# Patient Record
Sex: FEMALE | Race: WHITE | Employment: FULL TIME | URBAN - METROPOLITAN AREA
[De-identification: names, ages, dates, MRNs, and addresses within clinical notes are randomized per-mention and may not be internally consistent; named-entity substitution may affect disease eponyms.]

---

## 2017-01-10 NOTE — TELEPHONE ENCOUNTER
Patient last AE on 09/08/16. Did you want to refill spironolactone 100mg BID for PCOS for her? Please advise.

## 2017-01-12 RX ORDER — SPIRONOLACTONE 100 MG/1
TABLET, FILM COATED ORAL
Qty: 180 TAB | Refills: 2 | Status: SHIPPED | OUTPATIENT
Start: 2017-01-12 | End: 2017-10-26 | Stop reason: SDUPTHER

## 2017-09-18 RX ORDER — METFORMIN HYDROCHLORIDE 500 MG/1
TABLET, EXTENDED RELEASE ORAL
Qty: 360 TAB | Refills: 3 | OUTPATIENT
Start: 2017-09-18

## 2017-09-19 ENCOUNTER — OFFICE VISIT (OUTPATIENT)
Dept: INTERNAL MEDICINE CLINIC | Age: 42
End: 2017-09-19

## 2017-09-19 VITALS
HEART RATE: 82 BPM | BODY MASS INDEX: 26.36 KG/M2 | HEIGHT: 64 IN | WEIGHT: 154.4 LBS | OXYGEN SATURATION: 99 % | DIASTOLIC BLOOD PRESSURE: 56 MMHG | SYSTOLIC BLOOD PRESSURE: 105 MMHG | TEMPERATURE: 98.6 F | RESPIRATION RATE: 14 BRPM

## 2017-09-19 DIAGNOSIS — E28.2 PCOS (POLYCYSTIC OVARIAN SYNDROME): ICD-10-CM

## 2017-09-19 DIAGNOSIS — G44.59 OTHER COMPLICATED HEADACHE SYNDROME: Primary | ICD-10-CM

## 2017-09-19 RX ORDER — BUTALBITAL, ACETAMINOPHEN AND CAFFEINE 50; 325; 40 MG/1; MG/1; MG/1
1 TABLET ORAL
Qty: 30 TAB | Refills: 0 | Status: SHIPPED | OUTPATIENT
Start: 2017-09-19 | End: 2017-09-29

## 2017-09-19 RX ORDER — METFORMIN HYDROCHLORIDE 500 MG/1
TABLET, EXTENDED RELEASE ORAL
Refills: 4 | COMMUNITY
Start: 2017-06-14 | End: 2017-09-29 | Stop reason: SDUPTHER

## 2017-09-19 NOTE — PROGRESS NOTES
HISTORY OF PRESENT ILLNESS  Kenna Chandra is a 40 y.o. female. HPI   Headache: Patient reports she has headaches when she was in high school and college. She states she has seen a neurologist in the pastand her headaches have gone away. Patient notes the headaches have returned. She states they last around 3 days. Patient states she has been tracking her headaches and can not find a trigger. She reports she gets headaches about 2 times a month. Patient notes she can not treat it with OTC pain medications. Patient states she has tried Aleve, Advil, and Excedrin with no relief. She states the headaches stays on one side of her temple. Patient reports she is at a computer all day and sometimes notices pain in her neck. PCOS: Patient reports she is still on Metformin and spirolactone. She states she will follow up with Dr. Doug Rogers soon. Mood: Patient reports her mood is doing well. Review of Systems   All other systems reviewed and are negative. Physical Exam   Constitutional: She is oriented to person, place, and time. She appears well-developed and well-nourished. HENT:   Head: Normocephalic and atraumatic. Right Ear: External ear normal.   Left Ear: External ear normal.   Nose: Nose normal.   Mouth/Throat: Oropharynx is clear and moist.   Eyes: Conjunctivae and EOM are normal.   Neck: Normal range of motion. Neck supple. Carotid bruit is not present. No thyroid mass and no thyromegaly present. Cardiovascular: Normal rate, regular rhythm, S1 normal, S2 normal, normal heart sounds and intact distal pulses. Pulmonary/Chest: Effort normal and breath sounds normal.   Abdominal: Soft. Normal appearance and bowel sounds are normal. There is no hepatosplenomegaly. There is no tenderness. Musculoskeletal: Normal range of motion. Neurological: She is alert and oriented to person, place, and time. She has normal strength. No cranial nerve deficit or sensory deficit.  Coordination normal.   Skin: Skin is warm, dry and intact. No abrasion and no rash noted. Psychiatric: She has a normal mood and affect. Her behavior is normal. Judgment and thought content normal.   Nursing note and vitals reviewed. ASSESSMENT and PLAN  Diagnoses and all orders for this visit:    1. Other complicated headache syndrome  Counseled patient on correct use of Fioricet. Patient has headaches 3 days a month and I will not put patient on daily preventative migraine medication at this time. Encouraged patient to watch condition and log headaches. Work on therapy and exercise, yoga and posture for her neck and spine  -     butalbital-acetaminophen-caffeine (FIORICET, ESGIC) -40 mg per tablet; Take 1 Tab by mouth every six (6) hours as needed for Pain. Max Daily Amount: 4 Tabs. 2. PCOS (polycystic ovarian syndrome)  Stable. I do not recommend any change in medications. lab results and schedule of future lab studies reviewed with patient  reviewed diet, exercise and weight control    Written by Nadia Slaughter, as dictated by Yaya Kauffman MD.     Current diagnosis and concerns discussed with pt at length. Understands risks and benefits or current treatment plan and medications and accepts the treatment and medication with any possible risks. Pt asks appropriate questions which were answered. Pt instructed to call with any concerns or problems.

## 2017-09-21 RX ORDER — DROSPIRENONE AND ETHINYL ESTRADIOL 0.02-3(28)
1 KIT ORAL DAILY
Qty: 1 PACKAGE | Refills: 0 | Status: SHIPPED | OUTPATIENT
Start: 2017-09-21 | End: 2017-09-29 | Stop reason: SDUPTHER

## 2017-09-21 RX ORDER — METFORMIN HYDROCHLORIDE 500 MG/1
TABLET, EXTENDED RELEASE ORAL
Qty: 120 TAB | Refills: 0 | Status: SHIPPED | OUTPATIENT
Start: 2017-09-21 | End: 2018-03-07

## 2017-09-29 ENCOUNTER — OFFICE VISIT (OUTPATIENT)
Dept: OBGYN CLINIC | Age: 42
End: 2017-09-29

## 2017-09-29 VITALS
DIASTOLIC BLOOD PRESSURE: 72 MMHG | HEIGHT: 64 IN | SYSTOLIC BLOOD PRESSURE: 110 MMHG | BODY MASS INDEX: 26.12 KG/M2 | WEIGHT: 153 LBS

## 2017-09-29 DIAGNOSIS — N92.6 IRREGULAR MENSES: ICD-10-CM

## 2017-09-29 DIAGNOSIS — Z01.419 ENCOUNTER FOR GYNECOLOGICAL EXAMINATION (GENERAL) (ROUTINE) WITHOUT ABNORMAL FINDINGS: Primary | ICD-10-CM

## 2017-09-29 DIAGNOSIS — E28.2 PCOS (POLYCYSTIC OVARIAN SYNDROME): ICD-10-CM

## 2017-09-29 RX ORDER — METFORMIN HYDROCHLORIDE 500 MG/1
TABLET, EXTENDED RELEASE ORAL
Qty: 120 TAB | Refills: 4 | Status: SHIPPED | OUTPATIENT
Start: 2017-09-29 | End: 2018-03-26 | Stop reason: SDUPTHER

## 2017-09-29 RX ORDER — DROSPIRENONE AND ETHINYL ESTRADIOL 0.02-3(28)
1 KIT ORAL DAILY
Qty: 3 PACKAGE | Refills: 4 | Status: SHIPPED | OUTPATIENT
Start: 2017-09-29 | End: 2018-12-08 | Stop reason: SDUPTHER

## 2017-09-29 NOTE — PROGRESS NOTES
[facility]  [email or web address]  [phone]    Dianna Arellano MD, [facility]       Annual Gynecologic Exam  WWE 40-60  Chief Complaint   Patient presents with    Well Woman       Angela Mcdonald is a 40 y.o.  [facility]  female who presents for an annual exam.  Patient's last menstrual period was 2017. Mai Wilcox She does not report additional concerns today. She reports fluid retention better on current meds, but still concerned about gaining weight. Menstrual status:  Her periods are moderate. She does not report dysmenorrhea/painful menses. She does not report irregular bleeding. Sexual history and Contraception:  History   Sexual Activity    Sexual activity: Yes    Partners: Male    Birth control/ protection: Pill     She never uses condoms with sexual activity. She does not reports new sexual partner(s) in the last year. The patient does not request STD testing. Preventive Medicine History:  Her last annual GYN exam was about one year ago. Her most recent Pap smear result: normal was obtained in 2015. Her most recent HR HPV screen was Negative obtained 2 year(s) ago. She does not have a history of VICKY 2, 3 or cervical cancer. Breast health:  Last mammogram: approximate date 16 and was normal.   A mammogram was scheduled for today. Breast cancer family updated: see . Bone health: Mai Wilcox She does not have a history of osteopenia/osteoporosis. Osteoporosis family history updated: see .      Past Medical History:   Diagnosis Date    Encounter for screening mammogram for breast cancer 2016    neg    HX OTHER MEDICAL     infertility,unscpeified    Pap smear for cervical cancer screening 3/11/10; 9/2/15    neg,hpv neg; Negative, HPV negative    PCOS (polycystic ovarian syndrome)      OB History    Para Term  AB Living   2 1 1  1 1   SAB TAB Ectopic Molar Multiple Live Births   1     1      # Outcome Date GA Lbr Kaiden/2nd Weight Sex Delivery Anes PTL Lv   2 SAB            1 Term         FARHAN          Past Surgical History:   Procedure Laterality Date    HX OTHER SURGICAL  5/29/12    benign     Family History   Problem Relation Age of Onset    Elevated Lipids Mother     No Known Problems Father      Social History     Social History    Marital status:      Spouse name: N/A    Number of children: N/A    Years of education: N/A     Occupational History    Not on file. Social History Main Topics    Smoking status: Never Smoker    Smokeless tobacco: Never Used    Alcohol use 0.0 oz/week     0 Standard drinks or equivalent per week      Comment: Beer/wine-socially.  Drug use: No    Sexual activity: Yes     Partners: Male     Birth control/ protection: Pill     Other Topics Concern    Not on file     Social History Narrative       No Known Allergies    Current Outpatient Prescriptions   Medication Sig    drospirenone-ethinyl estradiol (CJ) 3-0.02 mg tab Take 1 Tab by mouth daily.  metFORMIN ER (GLUCOPHAGE XR) 500 mg tablet TAKE 4 TABLETS BY MOUTH DAILY WITH DINNER    metFORMIN ER (GLUCOPHAGE XR) 500 mg tablet Take 4 tabs po daily at dinner.  spironolactone (ALDACTONE) 100 mg tablet TAKE 1 TABLET BY MOUTH TWICE A DAY     No current facility-administered medications for this visit.         Patient Active Problem List   Diagnosis Code    Hirsutism L68.0    PCOS (polycystic ovarian syndrome) E28.2    Irregular menses N92.6       Review of Systems - History obtained from the patient  Constitutional: negative for weight loss, fever, night sweats  HEENT: negative for hearing loss, earache, congestion, snoring, sorethroat  CV: negative for chest pain, palpitations, edema  Resp: negative for cough, shortness of breath, wheezing  GI: negative for change in bowel habits, abdominal pain, black or bloody stools  : negative for frequency, dysuria, hematuria, vaginal discharge  MSK: negative for back pain, joint pain, muscle pain  Breast: negative for breast lumps, nipple discharge, galactorrhea  Skin :negative for itching, rash, hives  Neuro: negative for dizziness, headache, confusion, weakness  Psych: negative for anxiety, depression, change in mood  Heme/lymph: negative for bleeding, bruising, pallor    Physical Exam  Visit Vitals    /72    Ht 5' 4\" (1.626 m)    Wt 153 lb (69.4 kg)    LMP 09/21/2017    BMI 26.26 kg/m2       Constitutional  · Appearance: well-nourished, well developed, alert, in no acute distress    HENT  · Head and Face: appears normal    Neck  · Inspection/Palpation: normal appearance, no masses or tenderness  · Lymph Nodes: no lymphadenopathy present  · Thyroid: gland size normal, nontender, no nodules or masses present on palpation    Chest  · Respiratory Effort: breathing unlabored  · Auscultation: normal breath sounds    Cardiovascular  · Heart:  · Auscultation: regular rate and rhythm without murmur    Breasts  · Inspection of Breasts: breasts symmetrical, no skin changes, no discharge present, nipple appearance normal, no skin retraction present  · Palpation of Breasts and Axillae: no masses present on palpation, no breast tenderness  · Axillary Lymph Nodes: no lymphadenopathy present    Gastrointestinal  · Abdominal Examination: abdomen non-tender to palpation, normal bowel sounds, no masses present  · Liver and spleen: no hepatomegaly present, spleen not palpable  · Hernias: no hernias identified    Genitourinary  · External Genitalia: normal appearance for age, no discharge present, no tenderness present, no inflammatory lesions present, no masses present, no atrophy present  · Vagina: normal vaginal vault without central or paravaginal defects, no discharge present, no inflammatory lesions present, no masses present  · Bladder: non-tender to palpation  · Urethra: appears normal  · Cervix: normal   · Uterus: normal size, shape and consistency  · Adnexa: no adnexal tenderness present, no adnexal masses present  · Perineum: perineum within normal limits, no evidence of trauma, no rashes or skin lesions present  · Anus: anus within normal limits, no hemorrhoids present  · Inguinal Lymph Nodes: no lymphadenopathy present    Skin  · General Inspection: no rash, no lesions identified    Neurologic/Psychiatric  · Mental Status:  · Orientation: grossly oriented to person, place and time  · Mood and Affect: mood normal, affect appropriate    Assessment:  40 y.o.  for well woman exam  Encounter Diagnoses   Name Primary?  PCOS (polycystic ovarian syndrome)     Encounter for gynecological examination (general) (routine) without abnormal findings Yes    Irregular menses        Plan:  The patient was counseled about diet, exercise, healthy lifestyle  We discussed current self breast exam and mammogram recommendations  We discussed current pap smear and HR HPV testing guidelines  We recommend follow up in one year for routine annual gynecologic exam or sooner if needed  We recommend follow up with a primary care physician for any chronic medical problems or non-gynecologic concerns    We discussed calcium/vitamin D/weight bearing exercise and osteoporosis prevention  Handouts were given to the patient   Pt wants to continue metformin with OCP. Reports it helps with weight. Disc trying to wean down metformin this year or in the future while on OCP. We discussed progesterone only and non hormonal options for contraception including but not limited to condoms, IUDs, Nexplanon, and depo provera. Pt wants to continue on ocp  Discussed risks, benefits and alternatives of OCP/nuvaring/patch: including but not limited to dvt/pe/mi/cva/ca/gi risks. She is encouraged to read package insert and to follow up with me or her pharmacist with any questions or concerns. Disc potential increase risks with buzz/everardo and interaction with other medications and potassium levels. Spironolactone: will wean to every day, and try to d/c  PCOS labs    Folllow up:  [x] return for annual well woman exam in one year or sooner if she is having problems  [] follow up and ultrasound  [x] mammogram  [] 6 months  [] 3 months  [] 1 month    Orders Placed This Encounter    17-OH PROGESTERONE LCMS    HEMOGLOBIN A1C WITH EAG    DHEA SULFATE    TESTOSTERONE, FREE & TOTAL    T4, FREE    PROLACTIN    TSH 3RD GENERATION    drospirenone-ethinyl estradiol (CJ) 3-0.02 mg tab    metFORMIN ER (GLUCOPHAGE XR) 500 mg tablet       No results found for any visits on 09/29/17.

## 2017-09-29 NOTE — MR AVS SNAPSHOT
Visit Information Date & Time Provider Department Dept. Phone Encounter #  
 9/29/2017  8:00 AM Phani White MD Blake Masterson [phone] [id number] Your Appointments 11/29/2017  7:40 AM  
MAMMOGRAPHY with MAMMOGRAM, LAMBERTO Masterson ([facility]) Appt Note: mammo only, ae done on 9/29/17  
 [address] [facility] [address] [place] [address]  
[phone]  
  
   
 [address]  
  
    
 1/16/2018  1:30 PM  
COMPLETE PHYSICAL with Bobby Peralta MD  
[facility]) Appt Note: annual physical  
 [address] [place] [id number]  
[phone]  
  
   
 [address] [place] [id number] Upcoming Health Maintenance Date Due INFLUENZA AGE 9 TO ADULT 8/1/2017 BREAST CANCER SCRN MAMMOGRAM 11/28/2017 PAP AKA CERVICAL CYTOLOGY 9/2/2020 Allergies as of 9/29/2017  Review Complete On: 9/29/2017 By: Caryl Ro LPN No Known Allergies Current Immunizations  Reviewed on 7/31/2013 Name Date Tdap 8/1/2013 Not reviewed this visit Vitals BP Height(growth percentile) Weight(growth percentile) LMP BMI OB Status 110/72 5' 4\" (1.626 m) 153 lb (69.4 kg) 09/21/2017 26.26 kg/m2 Having regular periods Smoking Status Never Smoker BMI and BSA Data Body Mass Index Body Surface Area  
 26.26 kg/m 2 1.77 m 2 Preferred Pharmacy Pharmacy Name Phone [id number] [address], [address] [phone] Your Updated Medication List  
  
   
This list is accurate as of: 9/29/17  8:18 AM.  Always use your most recent med list.  
  
  
  
  
 butalbital-acetaminophen-caffeine -40 mg per tablet Commonly known as:  Donnalee Sizer Take 1 Tab by mouth every six (6) hours as needed for Pain. Max Daily Amount: 4 Tabs. drospirenone-ethinyl estradiol 3-0.02 mg Tab Commonly known as:  CJ Take 1 Tab by mouth daily. * metFORMIN  mg tablet Commonly known as:  GLUCOPHAGE XR  
TAKE 4 TABLETS BY MOUTH DAILY WITH DINNER  
  
 * metFORMIN  mg tablet Commonly known as:  GLUCOPHAGE XR Take 4 tabs po daily at dinner. spironolactone 100 mg tablet Commonly known as:  ALDACTONE  
TAKE 1 TABLET BY MOUTH TWICE A DAY * Notice: This list has 2 medication(s) that are the same as other medications prescribed for you. Read the directions carefully, and ask your doctor or other care provider to review them with you. Patient Instructions Well Visit, Ages 25 to 48: Care Instructions Your Care Instructions Physical exams can help you stay healthy. Your doctor has checked your overall health and may have suggested ways to take good care of yourself. He or she also may have recommended tests. At home, you can help prevent illness with healthy eating, regular exercise, and other steps. Follow-up care is a key part of your treatment and safety. Be sure to make and go to all appointments, and call your doctor if you are having problems. It's also a good idea to know your test results and keep a list of the medicines you take. How can you care for yourself at home? · Reach and stay at a healthy weight. This will lower your risk for many problems, such as obesity, diabetes, heart disease, and high blood pressure. · Get at least 30 minutes of physical activity on most days of the week. Walking is a good choice. You also may want to do other activities, such as running, swimming, cycling, or playing tennis or team sports. Discuss any changes in your exercise program with your doctor. · Do not smoke or allow others to smoke around you. If you need help quitting, talk to your doctor about stop-smoking programs and medicines. These can increase your chances of quitting for good. · Talk to your doctor about whether you have any risk factors for sexually transmitted infections (STIs). Having one sex partner (who does not have STIs and does not have sex with anyone else) is a good way to avoid these infections. · Use birth control if you do not want to have children at this time. Talk with your doctor about the choices available and what might be best for you. · Protect your skin from too much sun. When you're outdoors from 10 a.m. to 4 p.m., stay in the shade or cover up with clothing and a hat with a wide brim. Wear sunglasses that block UV rays. Even when it's cloudy, put broad-spectrum sunscreen (SPF 30 or higher) on any exposed skin. · See a dentist one or two times a year for checkups and to have your teeth cleaned. · Wear a seat belt in the car. · Drink alcohol in moderation, if at all. That means no more than 2 drinks a day for men and 1 drink a day for women. Follow your doctor's advice about when to have certain tests. These tests can spot problems early. For everyone · Cholesterol. Have the fat (cholesterol) in your blood tested after age 21. Your doctor will tell you how often to have this done based on your age, family history, or other things that can increase your risk for heart disease. · Blood pressure. Have your blood pressure checked during a routine doctor visit. Your doctor will tell you how often to check your blood pressure based on your age, your blood pressure results, and other factors. · Vision. Talk with your doctor about how often to have a glaucoma test. 
· Diabetes. Ask your doctor whether you should have tests for diabetes. · Colon cancer. Have a test for colon cancer at age 48. You may have one of several tests. If you are younger than 48, you may need a test earlier if you have any risk factors.  Risk factors include whether you already had a precancerous polyp removed from your colon or whether your parent, brother, sister, or child has had colon cancer. For women · Breast exam and mammogram. Talk to your doctor about when you should have a clinical breast exam and a mammogram. Medical experts differ on whether and how often women under 50 should have these tests. Your doctor can help you decide what is right for you. · Pap test and pelvic exam. Begin Pap tests at age 24. A Pap test is the best way to find cervical cancer. The test often is part of a pelvic exam. Ask how often to have this test. 
· Tests for sexually transmitted infections (STIs). Ask whether you should have tests for STIs. You may be at risk if you have sex with more than one person, especially if your partners do not wear condoms. For men · Tests for sexually transmitted infections (STIs). Ask whether you should have tests for STIs. You may be at risk if you have sex with more than one person, especially if you do not wear a condom. · Testicular cancer exam. Ask your doctor whether you should check your testicles regularly. · Prostate exam. Talk to your doctor about whether you should have a blood test (called a PSA test) for prostate cancer. Experts differ on whether and when men should have this test. Some experts suggest it if you are older than 39 and are -American or have a father or brother who got prostate cancer when he was younger than 72. When should you call for help? Watch closely for changes in your health, and be sure to contact your doctor if you have any problems or symptoms that concern you. Where can you learn more? Go to http://ag-robert.info/. Enter P072 in the search box to learn more about \"Well Visit, Ages 25 to 48: Care Instructions. \" Current as of: July 19, 2016 Content Version: 11.3 © 6731-4607 [employer].  Care instructions adapted under license by [employer] (which disclaims liability or warranty for this information). If you have questions about a medical condition or this instruction, always ask your healthcare professional. [address] any warranty or liability for your use of this information. Introducing [facility]! Dear Kelli Michelle: Thank you for requesting a [employer] account. Our records indicate that you already have an active [employer] account. You can access your account anytime at [email or web address] Did you know that you can access your hospital and ER discharge instructions at any time in [employer]? You can also review all of your test results from your hospital stay or ER visit. Additional Information If you have questions, please visit the Frequently Asked Questions section of the [employer] website at [email or web address] Remember, [employer] is NOT to be used for urgent needs. For medical emergencies, dial 911. Now available from your iPhone and Android! Please provide this summary of care documentation to your next provider. Your primary care clinician is listed as Maria Fernanda Anderson. If you have any questions after today's visit, please call [phone].

## 2017-09-29 NOTE — PATIENT INSTRUCTIONS

## 2017-10-05 LAB
17OHP SERPL-MCNC: 10 NG/DL
DHEA-S SERPL-MCNC: 177.8 UG/DL (ref 57.3–279.2)
EST. AVERAGE GLUCOSE BLD GHB EST-MCNC: 97 MG/DL
HBA1C MFR BLD: 5 % (ref 4.8–5.6)
PROLACTIN SERPL-MCNC: 9.4 NG/ML (ref 4.8–23.3)
T4 FREE SERPL-MCNC: 1.35 NG/DL (ref 0.82–1.77)
TESTOST FREE SERPL-MCNC: 1.3 PG/ML (ref 0–4.2)
TESTOST SERPL-MCNC: 4 NG/DL (ref 8–48)
TSH SERPL DL<=0.005 MIU/L-ACNC: 2.93 UIU/ML (ref 0.45–4.5)

## 2017-10-19 NOTE — PROGRESS NOTES
The results are normal x test low (was elevated in past). Ok to observe  Please notify patient. Recommend f/u if still having symptoms/problems or has additional concerns.

## 2017-11-29 ENCOUNTER — OFFICE VISIT (OUTPATIENT)
Dept: OBGYN CLINIC | Age: 42
End: 2017-11-29

## 2017-11-29 DIAGNOSIS — Z12.31 ENCOUNTER FOR SCREENING MAMMOGRAM FOR MALIGNANT NEOPLASM OF BREAST: Primary | ICD-10-CM

## 2018-01-03 ENCOUNTER — OFFICE VISIT (OUTPATIENT)
Dept: NEUROLOGY | Age: 43
End: 2018-01-03

## 2018-01-03 VITALS
OXYGEN SATURATION: 98 % | RESPIRATION RATE: 20 BRPM | HEIGHT: 64 IN | BODY MASS INDEX: 27.45 KG/M2 | HEART RATE: 101 BPM | DIASTOLIC BLOOD PRESSURE: 78 MMHG | SYSTOLIC BLOOD PRESSURE: 104 MMHG | WEIGHT: 160.8 LBS

## 2018-01-03 DIAGNOSIS — G43.011 MIGRAINE WITHOUT AURA, INTRACTABLE, WITH STATUS MIGRAINOSUS: Primary | ICD-10-CM

## 2018-01-03 RX ORDER — SUMATRIPTAN AND NAPROXEN SODIUM 85; 500 MG/1; MG/1
TABLET, FILM COATED ORAL
Qty: 9 TAB | Refills: 3 | Status: SHIPPED | OUTPATIENT
Start: 2018-01-03 | End: 2018-03-07 | Stop reason: SDUPTHER

## 2018-01-03 NOTE — MR AVS SNAPSHOT
Visit Information Date & Time Provider Department Dept. Phone Encounter #  
 1/3/2018  9:00 AM [facility] [phone] [id number] Follow-up Instructions Return in about 2 months (around 3/3/2018). Your Appointments 1/16/2018  1:30 PM  
COMPLETE PHYSICAL with Alicia Yu MD  
[facility] [address] [facility]) Appt Note: annual physical  
 [address] [facility] [address] [id number]  
[phone]  
  
   
 [address] [facility] [address] Upcoming Health Maintenance Date Due Influenza Age 5 to Adult 8/1/2017 BREAST CANCER SCRN MAMMOGRAM 11/29/2018 PAP AKA CERVICAL CYTOLOGY 9/2/2020 DTaP/Tdap/Td series (2 - Td) 8/1/2023 Allergies as of 1/3/2018  Review Complete On: 1/3/2018 By: Aminta Ordonez LPN No Known Allergies Current Immunizations  Reviewed on 7/31/2013 Name Date Tdap 8/1/2013 Not reviewed this visit Vitals BP Pulse Resp Height(growth percentile) Weight(growth percentile) SpO2  
 104/78 (!) 101 20 5' 4\" (1.626 m) 160 lb 12.8 oz (72.9 kg) 98% BMI OB Status Smoking Status 27.6 kg/m2 Having regular periods Never Smoker Vitals History BMI and BSA Data Body Mass Index Body Surface Area  
 27.6 kg/m 2 1.81 m 2 Preferred Pharmacy Pharmacy Name Phone [address], [address] 1037 [facility] [phone] Your Updated Medication List  
  
   
This list is accurate as of: 1/3/18  9:42 AM.  Always use your most recent med list.  
  
  
  
  
 drospirenone-ethinyl estradiol 3-0.02 mg Tab Commonly known as:  CJ Take 1 Tab by mouth daily. * metFORMIN  mg tablet Commonly known as:  GLUCOPHAGE XR Take 4 tabs po daily at dinner. * metFORMIN  mg tablet Commonly known as:  GLUCOPHAGE XR  
 TAKE 4 TABLETS BY MOUTH DAILY WITH DINNER  
  
 spironolactone 100 mg tablet Commonly known as:  ALDACTONE  
TAKE 1 TABLET BY MOUTH TWICE A DAY  
  
 SUMAtriptan-naproxen  mg tablet Commonly known as:  TREXIMET Take one tablet at headache onset and repeat in 2 hours x1 prn * Notice: This list has 2 medication(s) that are the same as other medications prescribed for you. Read the directions carefully, and ask your doctor or other care provider to review them with you. Prescriptions Sent to Pharmacy Refills SUMAtriptan-naproxen (TREXIMET)  mg tablet 3 Sig: Take one tablet at headache onset and repeat in 2 hours x1 prn Class: Normal  
 Pharmacy: [id number] [facility] or [facility] #: [phone] Follow-up Instructions Return in about 2 months (around 3/3/2018). Patient Instructions A Healthy Lifestyle: Care Instructions Your Care Instructions A healthy lifestyle can help you feel good, stay at a healthy weight, and have plenty of energy for both work and play. A healthy lifestyle is something you can share with your whole family. A healthy lifestyle also can lower your risk for serious health problems, such as high blood pressure, heart disease, and diabetes. You can follow a few steps listed below to improve your health and the health of your family. Follow-up care is a key part of your treatment and safety. Be sure to make and go to all appointments, and call your doctor if you are having problems. It's also a good idea to know your test results and keep a list of the medicines you take. How can you care for yourself at home? · Do not eat too much sugar, fat, or fast foods. You can still have dessert and treats now and then. The goal is moderation. · Start small to improve your eating habits.  Pay attention to portion sizes, drink less juice and soda pop, and eat more fruits and vegetables. ¨ Eat a healthy amount of food. A 3-ounce serving of meat, for example, is about the size of a deck of cards. Fill the rest of your plate with vegetables and whole grains. ¨ Limit the amount of soda and sports drinks you have every day. Drink more water when you are thirsty. ¨ Eat at least 5 servings of fruits and vegetables every day. It may seem like a lot, but it is not hard to reach this goal. A serving or helping is 1 piece of fruit, 1 cup of vegetables, or 2 cups of leafy, raw vegetables. Have an apple or some carrot sticks as an afternoon snack instead of a candy bar. Try to have fruits and/or vegetables at every meal. 
· Make exercise part of your daily routine. You may want to start with simple activities, such as walking, bicycling, or slow swimming. Try to be active 30 to 60 minutes every day. You do not need to do all 30 to 60 minutes all at once. For example, you can exercise 3 times a day for 10 or 20 minutes. Moderate exercise is safe for most people, but it is always a good idea to talk to your doctor before starting an exercise program. 
· Keep moving. Kath Silence the lawn, work in the garden, or [employer]io[employer]ajo. Take the stairs instead of the elevator at work. · If you smoke, quit. People who smoke have an increased risk for heart attack, stroke, cancer, and other lung illnesses. Quitting is hard, but there are ways to boost your chance of quitting tobacco for good. ¨ Use nicotine gum, patches, or lozenges. ¨ Ask your doctor about stop-smoking programs and medicines. ¨ Keep trying. In addition to reducing your risk of diseases in the future, you will notice some benefits soon after you stop using tobacco. If you have shortness of breath or asthma symptoms, they will likely get better within a few weeks after you quit. · Limit how much alcohol you drink.  Moderate amounts of alcohol (up to 2 drinks a day for men, 1 drink a day for women) are okay. But drinking too much can lead to liver problems, high blood pressure, and other health problems. Family health If you have a family, there are many things you can do together to improve your health. · Eat meals together as a family as often as possible. · Eat healthy foods. This includes fruits, vegetables, lean meats and dairy, and whole grains. · Include your family in your fitness plan. Most people think of activities such as jogging or tennis as the way to fitness, but there are many ways you and your family can be more active. Anything that makes you breathe hard and gets your heart pumping is exercise. Here are some tips: 
¨ Walk to do errands or to take your child to school or the bus. ¨ Go for a family bike ride after dinner instead of watching TV. Where can you learn more? Go to http://agSmartZip Analyticsrobert.info/. Enter N734 in the search box to learn more about \"A Healthy Lifestyle: Care Instructions. \" Current as of: May 12, 2017 Content Version: 11.4 © 4632-4722 [employer]efactory. Care instructions adapted under license by Vayable (which disclaims liability or warranty for this information). If you have questions about a medical condition or this instruction, always ask your healthcare professional. [address] any warranty or liability for your use of this information. Track headaches on a calendar and bring to each appointment. Use Treximet for acute HA.  1 at HA onset and repeat in 2 hours x1 if needed. Maximum 2 in 24 hours Introducing [address]! Dear Trisha Grewal: Thank you for requesting a [employer] account. Our records indicate that you already have an active [employer] account. You can access your account anytime at [email or web address] Did you know that you can access your hospital and ER discharge instructions at any time in Maktoob? You can also review all of your test results from your hospital stay or ER visit. Additional Information If you have questions, please visit the Frequently Asked Questions section of the Maktoob website at [email or web address] Remember, Maktoob is NOT to be used for urgent needs. For medical emergencies, dial 911. Now available from your iPhone and Android! Please provide this summary of care documentation to your next provider. Your primary care clinician is listed as Fausto Becerra. If you have any questions after today's visit, please call [phone].

## 2018-01-03 NOTE — PATIENT INSTRUCTIONS
A Healthy Lifestyle: Care Instructions  Your Care Instructions    A healthy lifestyle can help you feel good, stay at a healthy weight, and have plenty of energy for both work and play. A healthy lifestyle is something you can share with your whole family. A healthy lifestyle also can lower your risk for serious health problems, such as high blood pressure, heart disease, and diabetes. You can follow a few steps listed below to improve your health and the health of your family. Follow-up care is a key part of your treatment and safety. Be sure to make and go to all appointments, and call your doctor if you are having problems. It's also a good idea to know your test results and keep a list of the medicines you take. How can you care for yourself at home? · Do not eat too much sugar, fat, or fast foods. You can still have dessert and treats now and then. The goal is moderation. · Start small to improve your eating habits. Pay attention to portion sizes, drink less juice and soda pop, and eat more fruits and vegetables. ¨ Eat a healthy amount of food. A 3-ounce serving of meat, for example, is about the size of a deck of cards. Fill the rest of your plate with vegetables and whole grains. ¨ Limit the amount of soda and sports drinks you have every day. Drink more water when you are thirsty. ¨ Eat at least 5 servings of fruits and vegetables every day. It may seem like a lot, but it is not hard to reach this goal. A serving or helping is 1 piece of fruit, 1 cup of vegetables, or 2 cups of leafy, raw vegetables. Have an apple or some carrot sticks as an afternoon snack instead of a candy bar. Try to have fruits and/or vegetables at every meal.  · Make exercise part of your daily routine. You may want to start with simple activities, such as walking, bicycling, or slow swimming. Try to be active 30 to 60 minutes every day. You do not need to do all 30 to 60 minutes all at once.  For example, you can exercise 3 times a day for 10 or 20 minutes. Moderate exercise is safe for most people, but it is always a good idea to talk to your doctor before starting an exercise program.  · Keep moving. [place] the lawn, work in the garden, or PPS. Take the stairs instead of the elevator at work. · If you smoke, quit. People who smoke have an increased risk for heart attack, stroke, cancer, and other lung illnesses. Quitting is hard, but there are ways to boost your chance of quitting tobacco for good. ¨ Use nicotine gum, patches, or lozenges. ¨ Ask your doctor about stop-smoking programs and medicines. ¨ Keep trying. In addition to reducing your risk of diseases in the future, you will notice some benefits soon after you stop using tobacco. If you have shortness of breath or asthma symptoms, they will likely get better within a few weeks after you quit. · Limit how much alcohol you drink. Moderate amounts of alcohol (up to 2 drinks a day for men, 1 drink a day for women) are okay. But drinking too much can lead to liver problems, high blood pressure, and other health problems. Family health  If you have a family, there are many things you can do together to improve your health. · Eat meals together as a family as often as possible. · Eat healthy foods. This includes fruits, vegetables, lean meats and dairy, and whole grains. · Include your family in your fitness plan. Most people think of activities such as jogging or tennis as the way to fitness, but there are many ways you and your family can be more active. Anything that makes you breathe hard and gets your heart pumping is exercise. Here are some tips:  ¨ Walk to do errands or to take your child to school or the bus. ¨ Go for a family bike ride after dinner instead of watching TV. Where can you learn more? Go to http://ag-robert.info/. Enter V086 in the search box to learn more about \"A Healthy Lifestyle: Care Instructions. \"  Current as of: May 12, 2017  Content Version: 11.4  © 5711-4793 Healthwise, Down. Care instructions adapted under license by Social Tree Media (which disclaims liability or warranty for this information). If you have questions about a medical condition or this instruction, always ask your healthcare professional. [address] any warranty or liability for your use of this information. Track headaches on a calendar and bring to each appointment. Use Treximet for acute HA.  1 at HA onset and repeat in 2 hours x1 if needed.   Maximum 2 in 24 hours

## 2018-01-03 NOTE — PROGRESS NOTES
She cant come up with any triggers   Left or right temple, no OTC drugs help   Ibuprofen, tylenol were tried with no relief   November she saw Dr. Zack Rutherford tried fiorcet and still not relief   No vomitting, sometimes nausea  No changes in vision   Loud sounds will sometimes bother her but no light sensitivity reported   Used to get them frequently when she was younger and then they went away, in the last 2 years they have come back   She is getting them atleast 1 a month,sometimes 2 and they last 3 days with medication the OTC stuff but it doesn't help

## 2018-01-04 RX ORDER — SUMATRIPTAN AND NAPROXEN SODIUM 85; 500 MG/1; MG/1
TABLET, FILM COATED ORAL
Qty: 9 TAB | Refills: 5 | Status: SHIPPED | COMMUNITY
Start: 2018-01-04 | End: 2018-03-07

## 2018-01-13 PROBLEM — G43.011 MIGRAINE WITHOUT AURA, INTRACTABLE, WITH STATUS MIGRAINOSUS: Status: ACTIVE | Noted: 2018-01-13

## 2018-01-13 NOTE — PROGRESS NOTES
[facility] [address]   [place] [address] [facility] [address] [place], [facility]    YPYITD   [phone] Fax             Referring: Alan Forbes MD      Chief Complaint   Patient presents with    Migraine     new patient      69-year-old right-handed woman who presents today for evaluation of what she calls frequent headaches lasting multiple days. She tells me that she has been having headaches for a number of years now but they have gotten to the point where she has not been able to find anything to effectively treat them. She says she has never been given any migraine specific medications. She has tried over-the-counter medications. She was recently given Fioricet and that did not help. She notes that the headaches are located either right or left generally temporal in location. She describes them as throbbing. They typically will settle in a retro-orbital fashion. No triggers. She thought perhaps they may be related to her menstrual cycle but she will get them at other times other than when she is actively menstruating. She has not found any food triggers. When she gets them she has associated phonophobia and nausea and says that it hurts to move. She tries to push through them. Her average number of headaches per month is 1-2 but they typically last 3 days. She therefore is having 3-6 headache days per month secondary to an effective treatment. She has not had any focal neurologic deficits with a headache. No aura. No visual loss. No loss of consciousness. No diplopia. No chest pain. No palpitations. No shortness of breath. No fever or chills. No rashes.       Past Medical History:   Diagnosis Date    Encounter for screening mammogram for breast cancer 11/28/2016; 11/29/17    Normal; Normal    Headache     HX OTHER MEDICAL     infertility,unscpeified    Pap smear for cervical cancer screening 3/11/10; 9/2/15    neg,hpv neg; Negative, HPV negative    PCOS (polycystic ovarian syndrome)        Past Surgical History:   Procedure Laterality Date    HX OTHER SURGICAL  5/29/12    benign     Current Outpatient Prescriptions on File Prior to Visit   Medication Sig Dispense Refill    spironolactone (ALDACTONE) 100 mg tablet TAKE 1 TABLET BY MOUTH TWICE A DAY 60 Tab 2    drospirenone-ethinyl estradiol (CJ) 3-0.02 mg tab Take 1 Tab by mouth daily. 3 Package 4    metFORMIN ER (GLUCOPHAGE XR) 500 mg tablet TAKE 4 TABLETS BY MOUTH DAILY WITH DINNER 120 Tab 4    metFORMIN ER (GLUCOPHAGE XR) 500 mg tablet Take 4 tabs po daily at dinner. 120 Tab 0     No current facility-administered medications on file prior to visit. No Known Allergies    Social History   Substance Use Topics    Smoking status: Never Smoker    Smokeless tobacco: Never Used    Alcohol use 0.0 oz/week     0 Standard drinks or equivalent per week      Comment: Beer/wine-socially. Family History   Problem Relation Age of Onset    Elevated Lipids Mother     No Known Problems Father     Headache Brother        Review of Systems  Pertinent positives and negatives are as noted above otherwise comprehensive systems review is negative    Examination  Visit Vitals    /78    Pulse (!) 101    Resp 20    Ht 5' 4\" (1.626 m)    Wt 72.9 kg (160 lb 12.8 oz)    SpO2 98%    BMI 27.6 kg/m2     Pleasant, well appearing. No icterus. Oropharynx clear. Supple neck without bruit. Heart regular. No murmur. No edema. Neurologically, she is awake, alert, and oriented with normal speech and language. Her cognition is normal.    Intact cranial nerves 2-12. No nystagmus. Visual fields full to confrontation. Disk margins are flat bilaterally. She has normal bulk and tone. She has no abnormal movement. She has no pronation or drift. She generates full strength in the upper and lower extremities to direct confrontational testing.   Reflexes are symmetrical in the upper and lower extremities bilaterally. Her toes are down bilaterally. No Skinner. Finger nose finger and rapid alternating movements are normal.  Steady gait. She is able to heel, toe, and tandem walk. No sensory deficit to primary modalities. No Romberg. Impression/Plan  43-year-old woman with migraine headaches and a normal neurological examination. Her issue is that she has not been able to find anything to adequately treat these and because of that, her headaches become a status migrainosus type situation. Certainly if we could give her a medication that would abort the headache successfully on the first day and ideally within 2 hours, we would markedly decrease the number of headache days she has per month. We discussed migraine pathophysiology. We discussed the medications used to treat migraine. We discussed the difference between migraine specific medications and general pain medications etc.  Discussed nonpharmacologic things that can be done and she was given both oral and written migraine education today. I have asked her to track her headaches on a calendar bring that each appointment. Will use Treximet to abort her headache. 1 at headache onset and repeat in 2 hours if the headache is not completely aborted. Administration, potential side effects, potential benefits and alternatives discussed. Follow in 2 months with her headache diary and we will look at that data and determine whether we need to make adjustment or whether we need to add a preventative etc.    Lina Valadez MD      This note was created using voice recognition software. Despite editing, there may be syntax errors. This note will not be viewable in [address]

## 2018-03-07 ENCOUNTER — OFFICE VISIT (OUTPATIENT)
Dept: NEUROLOGY | Age: 43
End: 2018-03-07

## 2018-03-07 VITALS
BODY MASS INDEX: 27.49 KG/M2 | SYSTOLIC BLOOD PRESSURE: 114 MMHG | DIASTOLIC BLOOD PRESSURE: 78 MMHG | HEIGHT: 64 IN | WEIGHT: 161 LBS

## 2018-03-07 DIAGNOSIS — G43.011 MIGRAINE WITHOUT AURA, INTRACTABLE, WITH STATUS MIGRAINOSUS: Primary | ICD-10-CM

## 2018-03-07 RX ORDER — SUMATRIPTAN AND NAPROXEN SODIUM 85; 500 MG/1; MG/1
1 TABLET, FILM COATED ORAL AS NEEDED
Qty: 2 TAB | Refills: 0 | Status: SHIPPED | COMMUNITY
Start: 2018-03-07 | End: 2018-04-24

## 2018-03-07 RX ORDER — SUMATRIPTAN AND NAPROXEN SODIUM 85; 500 MG/1; MG/1
TABLET, FILM COATED ORAL
Qty: 9 TAB | Refills: 3 | Status: SHIPPED | OUTPATIENT
Start: 2018-03-07 | End: 2018-04-24

## 2018-03-07 NOTE — PROGRESS NOTES
Mari Cloud is a 40 y.o. female who presents with the following  Chief Complaint   Patient presents with    Follow-up     migraines       HPI Patient comes in for a follow up for migraines. States she has about 10-15 days with a headache during each month and about half of these are migraines. Has been running out of her treximet because of frequency of headaches. Located in the left temple usually but can migrate during the day. She has the pain described as intense, sharp. She has nausea, no vomiting, light and sound sensitivity. She has noticed the treximet will help but then perhaps the same headache comes back the next day and worse. She will need 2 tablets for anything to fully abort. She has not noticed any triggers at the moment. 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 No other changes. No Known Allergies    Current Outpatient Prescriptions   Medication Sig    SUMAtriptan-naproxen (TREXIMET)  mg tablet Take one tablet at headache onset and repeat in 2 hours x1 prn    topiramate ER (TROKENDI XR) 100 mg capsule Take 1 capsule by mouth nightly    drospirenone-ethinyl estradiol (CJ) 3-0.02 mg tab Take 1 Tab by mouth daily.  metFORMIN ER (GLUCOPHAGE XR) 500 mg tablet TAKE 4 TABLETS BY MOUTH DAILY WITH DINNER     No current facility-administered medications for this visit.         History   Smoking Status    Never Smoker   Smokeless Tobacco    Never Used       Past Medical History:   Diagnosis Date    Encounter for screening mammogram for breast cancer 11/28/2016; 11/29/17    Normal; Normal    Headache     HX OTHER MEDICAL     infertility,unscpeified    Pap smear for cervical cancer screening 3/11/10; 9/2/15    neg,hpv neg; Negative, HPV negative    PCOS (polycystic ovarian syndrome)        Past Surgical History:   Procedure Laterality Date    HX OTHER SURGICAL  5/29/12    benign       Family History   Problem Relation Age of Onset    Elevated Lipids Mother     No Known Problems Father     Headache Brother        Social History     Social History    Marital status:      Spouse name: N/A    Number of children: N/A    Years of education: N/A     Social History Main Topics    Smoking status: Never Smoker    Smokeless tobacco: Never Used    Alcohol use 0.0 oz/week     0 Standard drinks or equivalent per week      Comment: Beer/wine-socially.  Drug use: No    Sexual activity: Yes     Partners: Male     Birth control/ protection: Pill     Other Topics Concern    None     Social History Narrative       Review of Systems   Eyes: Positive for photophobia. Negative for blurred vision. Respiratory: Negative for shortness of breath and wheezing. Cardiovascular: Negative for chest pain and palpitations. Gastrointestinal: Positive for nausea. Negative for vomiting. Neurological: Positive for headaches. Remainder of comprehensive review is negative. Physical Exam :    Visit Vitals    /78    Ht 5' 4\" (1.626 m)    Wt 73 kg (161 lb)    BMI 27.64 kg/m2       General: Well defined, nourished, and groomed individual in no acute distress.    Neck: Supple, nontender, no bruits, no pain with resistance to active range of motion.    Heart: Regular rate and rhythm, no murmurs, rub, or gallop. Normal S1S2. Lungs: Clear to auscultation bilaterally with equal chest expansion, no cough, no wheeze  Musculoskeletal: Extremities revealed no edema and had full range of motion of joints.    Psych: Good mood and bright affect    NEUROLOGICAL EXAMINATION:    Mental Status: Alert and oriented to person, place, and time    Cranial Nerves:    II, III, IV, VI: Visual acuity grossly intact. Visual fields are normal.    Pupils are equal, round, and reactive to light and accommodation.    Extra-ocular movements are full and fluid. Fundoscopic exam was benign, no ptosis or nystagmus.    V-XII: Hearing is grossly intact.  Facial features are symmetric, with normal sensation and strength. The palate rises symmetrically and the tongue protrudes midline. Sternocleidomastoids 5/5. Motor Examination: Normal tone, bulk, and strength, 5/5 muscle strength throughout. Coordination: Finger to nose was normal. No resting or intention tremor    Gait and Station: Steady while walking. Normal arm swing. No pronator drift. No muscle wasting or fasiculations noted. Reflexes: DTRs 2+ throughout. Results for orders placed or performed in visit on 09/29/17   17-OH PROGESTERONE LCMS   Result Value Ref Range    17-OH Progesterone 10 ng/dL   HEMOGLOBIN A1C WITH EAG   Result Value Ref Range    Hemoglobin A1c 5.0 4.8 - 5.6 %    Estimated average glucose 97 mg/dL   DHEA SULFATE   Result Value Ref Range    DHEA Sulfate 177.8 57.3 - 279.2 ug/dL   TESTOSTERONE, FREE & TOTAL   Result Value Ref Range    Testosterone 4 (L) 8 - 48 ng/dL    Free testosterone (Direct) 1.3 0.0 - 4.2 pg/mL   T4, FREE   Result Value Ref Range    T4, Free 1.35 0.82 - 1.77 ng/dL   PROLACTIN   Result Value Ref Range    Prolactin 9.4 4.8 - 23.3 ng/mL   TSH 3RD GENERATION   Result Value Ref Range    TSH 2.930 0.450 - 4.500 uIU/mL       Orders Placed This Encounter    SUMAtriptan-naproxen (TREXIMET)  mg tablet     Sig: Take one tablet at headache onset and repeat in 2 hours x1 prn     Dispense:  9 Tab     Refill:  3    topiramate ER (TROKENDI XR) 100 mg capsule     Sig: Take 1 capsule by mouth nightly     Dispense:  30 Cap     Refill:  5       1. Migraine without aura, intractable, with status migrainosus        Follow-up Disposition:  Return in about 2 months (around 5/7/2018). Migraines and headaches are worse. We discussed prevention vs abortive therapy. She agrees with prevention and we will start Trokendi XR up to 100 mg nightly for prevention of migraines and headaches. We discussed side effects.  We discussed goals of prevention therapy and keeping treximet for now as an effective prevention therapy helps treximet work better. Call with any changes. Keep tracking.        This note will not be viewable in [employer]

## 2018-03-07 NOTE — PATIENT INSTRUCTIONS
10 [facility]   Statement to Patients  April 1, 2014      In an effort to ensure the large volume of patient prescription refills is processed in the most efficient and expeditious manner, we are asking our patients to assist us by calling your Pharmacy for all prescription refills, this will include also your  Mail Order Pharmacy. The pharmacy will contact our office electronically to continue the refill process. Please do not wait until the last minute to call your pharmacy. We need at least 48 hours (2days) to fill prescriptions. We also encourage you to call your pharmacy before going to  your prescription to make sure it is ready. With regard to controlled substance prescription refill requests (narcotic refills) that need to be picked up at our office, we ask your cooperation by providing us with at least 72 hours (3days) notice that you will need a refill. We will not refill narcotic prescription refill requests after 4:00pm on any weekday, Monday through Thursday, or after 2:00pm on Fridays, or on the weekends. We encourage everyone to explore another way of getting your prescription refill request processed using [employer], our patient web portal through our electronic medical record system. [employer] is an efficient and effective way to communicate your medication request directly to the office and  downloadable as an ortiz on your smart phone . [employer] also features a review functionality that allows you to view your medication list as well as leave messages for your physician. Are you ready to get connected? If so please review the attatched instructions or speak to any of our staff to get you set up right away! Thank you so much for your cooperation. Should you have any questions please contact our Practice Administrator.     The Physicians and Staff,  [facility]

## 2018-03-07 NOTE — MR AVS SNAPSHOT
[address] [facility] [address] [facility] [id number] [phone] Patient: Fransico Banuelos MRN: [id number] PDW:[id number] Visit Information Date & Time Provider Department Dept. Phone Encounter #  
 3/7/2018  8:30 AM TEE Roberts [facility] [phone] [id number] Follow-up Instructions Return in about 2 months (around 5/7/2018). Upcoming Health Maintenance Date Due Influenza Age 5 to Adult 8/1/2017 BREAST CANCER SCRN MAMMOGRAM 11/29/2018 PAP AKA CERVICAL CYTOLOGY 9/2/2020 DTaP/Tdap/Td series (2 - Td) 8/1/2023 Allergies as of 3/7/2018  Review Complete On: 3/7/2018 By: Geovanna Siddiqi No Known Allergies Current Immunizations  Reviewed on 7/31/2013 Name Date Tdap 8/1/2013 Not reviewed this visit You Were Diagnosed With   
  
 Codes Comments Migraine without aura, intractable, with status migrainosus    -  Primary ICD-10-CM: G43.011 
ICD-9-CM: 346.13 Vitals BP Height(growth percentile) Weight(growth percentile) BMI OB Status Smoking Status 114/78 5' 4\" (1.626 m) 161 lb (73 kg) 27.64 kg/m2 Having regular periods Never Smoker BMI and BSA Data Body Mass Index Body Surface Area  
 27.64 kg/m 2 1.82 m 2 Preferred Pharmacy Pharmacy Name Phone [id number] [address], [address] [phone] Your Updated Medication List  
  
   
This list is accurate as of 3/7/18  9:13 AM.  Always use your most recent med list.  
  
  
  
  
 drospirenone-ethinyl estradiol 3-0.02 mg Tab Commonly known as:  CJ Take 1 Tab by mouth daily. metFORMIN  mg tablet Commonly known as:  GLUCOPHAGE XR  
TAKE 4 TABLETS BY MOUTH DAILY WITH DINNER  
  
 SUMAtriptan-naproxen  mg tablet Commonly known as:  TREXIMET Take one tablet at headache onset and repeat in 2 hours x1 prn topiramate  mg capsule Commonly known as:  TROKENDI XR Take 1 capsule by mouth nightly Prescriptions Sent to Pharmacy Refills SUMAtriptan-naproxen (TREXIMET)  mg tablet 3 Sig: Take one tablet at headache onset and repeat in 2 hours x1 prn Class: Normal  
 Pharmacy: [facility] [address] Ph #: [phone]  
 topiramate ER (TROKENDI XR) 100 mg capsule 5 Sig: Take 1 capsule by mouth nightly Class: Normal  
 Pharmacy: [address] [place] [address], [address] [place] [address] Ph #: [phone] Follow-up Instructions Return in about 2 months (around 5/7/2018). Patient Instructions PRESCRIPTION REFILL POLICY [facility] Statement to Patients April 1, 2014 In an effort to ensure the large volume of patient prescription refills is processed in the most efficient and expeditious manner, we are asking our patients to assist us by calling your Pharmacy for all prescription refills, this will include also your  Mail Order Pharmacy. The pharmacy will contact our office electronically to continue the refill process. Please do not wait until the last minute to call your pharmacy. We need at least 48 hours (2days) to fill prescriptions. We also encourage you to call your pharmacy before going to  your prescription to make sure it is ready. With regard to controlled substance prescription refill requests (narcotic refills) that need to be picked up at our office, we ask your cooperation by providing us with at least 72 hours (3days) notice that you will need a refill. We will not refill narcotic prescription refill requests after 4:00pm on any weekday, Monday through Thursday, or after 2:00pm on Fridays, or on the weekends.   
  
We encourage everyone to explore another way of getting your prescription refill request processed using [employer], our patient web portal through our electronic medical record system. [employer] is an efficient and effective way to communicate your medication request directly to the office and  downloadable as an ortiz on your smart phone . [employer] also features a review functionality that allows you to view your medication list as well as leave messages for your physician. Are you ready to get connected? If so please review the attatched instructions or speak to any of our staff to get you set up right away! Thank you so much for your cooperation. Should you have any questions please contact our Practice Administrator. The Physicians and Staff,  [facility] Introducing [facility]! Dear Kay Lafleur: Thank you for requesting a [employer] account. Our records indicate that you already have an active [employer] account. You can access your account anytime at [email or web address] Did you know that you can access your hospital and ER discharge instructions at any time in [employer]? You can also review all of your test results from your hospital stay or ER visit. Additional Information If you have questions, please visit the Frequently Asked Questions section of the [employer] website at [email or web address] Remember, [employer] is NOT to be used for urgent needs. For medical emergencies, dial 911. Now available from your iPhone and Android! Please provide this summary of care documentation to your next provider. Your primary care clinician is listed as Bobby Dominguez. If you have any questions after today's visit, please call [phone].

## 2018-03-07 NOTE — PROGRESS NOTES
Patient is here for migraine follow up. Migraines have gotten worse since January. 3-4 per month lasting 72 hours without medication. treximet does help, but normally has to take 2 tablets.

## 2018-03-26 ENCOUNTER — PATIENT MESSAGE (OUTPATIENT)
Dept: OBGYN CLINIC | Age: 43
End: 2018-03-26

## 2018-03-26 RX ORDER — METFORMIN HYDROCHLORIDE 500 MG/1
TABLET, EXTENDED RELEASE ORAL
Qty: 120 TAB | Refills: 5 | Status: SHIPPED | OUTPATIENT
Start: 2018-03-26 | End: 2018-12-07 | Stop reason: SDUPTHER

## 2018-03-26 NOTE — TELEPHONE ENCOUNTER
Deborah Hayes MD 3/26/2018 11:00 AM EDT      Ok to rf metformin  If she opted not to wean. TRP  ----- Message -----   From: Gurmeet Holcomb LPN   Sent: 4/38/4077 10:48 AM   To: Deborah Hayes MD  Subject: FW: Prescription Question         ----- Message -----   From: Heidi Orellana   Sent: 3/26/2018 10:42 AM   To: Gurmeet Holcomb LPN  Subject: RE: Prescription Question     I only remember discussing the Aldactone which I am no longer taking. I am still taking 4 pills of day of the Metformin.  ----- Message -----  From: Gurmeet Holcomb LPN  Sent: 6/36/3053 10:31 AM EDT  To: Ted Urbina  Subject: RE: Prescription Question    Ted Steve,    This is Yary Pathak, one of the triage nurses and at your last appointment, Dr. Magdalena Gonzalez noted that she wanted you to wean down the metformin. She sent in enough to have you wean down. Were you taking it every day still? Please let me know. Thank you,    Don Colvin LPN  Triage Nurse      ----- Message -----   From: Massiel Urbina   Sent: 3/26/2018 10:23 AM EDT   To: Deborah Hayes MD  Subject: Prescription Question    Dr. Magdalena Gonzalez,    Is there any reason why my Metformin was not approved for refill? I'm not scheduled to see you until September. We didn't discuss taking me off of this, only Aldactone.

## 2018-04-23 ENCOUNTER — TELEPHONE (OUTPATIENT)
Dept: NEUROLOGY | Age: 43
End: 2018-04-23

## 2018-04-23 NOTE — TELEPHONE ENCOUNTER
Received RIGOBERTO SHELLEY denial notice via M key # [id number] regarding Sumatriptan - Naproxen   Reason : patient must have tried and failed raul Gomez amerge PA case closed unfavorable for patient     Lo Guevara and Osiris Julio informed of this matter

## 2018-04-24 RX ORDER — ELETRIPTAN HYDROBROMIDE 40 MG/1
TABLET, FILM COATED ORAL
Qty: 9 TAB | Refills: 5 | Status: SHIPPED | OUTPATIENT
Start: 2018-04-24 | End: 2018-12-26

## 2018-04-25 NOTE — TELEPHONE ENCOUNTER
Patient informed.      Per NP:  Sent relpax     Use Relpax to abort a headache.  1 at HA onset and repeat in 2 hours if needed.  Max 2 in 24 hours (Routing comment)

## 2018-05-10 ENCOUNTER — OFFICE VISIT (OUTPATIENT)
Dept: NEUROLOGY | Age: 43
End: 2018-05-10

## 2018-05-10 VITALS
HEIGHT: 64 IN | DIASTOLIC BLOOD PRESSURE: 76 MMHG | BODY MASS INDEX: 27.9 KG/M2 | SYSTOLIC BLOOD PRESSURE: 114 MMHG | OXYGEN SATURATION: 98 % | HEART RATE: 78 BPM | WEIGHT: 163.4 LBS | RESPIRATION RATE: 18 BRPM

## 2018-05-10 DIAGNOSIS — G43.011 MIGRAINE WITHOUT AURA, INTRACTABLE, WITH STATUS MIGRAINOSUS: Primary | ICD-10-CM

## 2018-05-10 DIAGNOSIS — T88.7XXA MEDICATION SIDE EFFECTS: ICD-10-CM

## 2018-05-10 NOTE — PROGRESS NOTES
[address] [facility] [address]   [place] [address]   [place], [facility] [address]   [phone] [id number]   [phone] Fax               Chief Complaint   Patient presents with    Migraine     states trokendi has helped with frequency of migraines but not crazy about SE including tingling in feet, inability to drink anything carbonated, and tired     Current Outpatient Prescriptions   Medication Sig Dispense Refill    eletriptan (RELPAX) 40 mg tablet 1 at HA onset and repeat in 2 hours if needed. Max 2 in 24 hours 9 Tab 5    metFORMIN ER (GLUCOPHAGE XR) 500 mg tablet TAKE 4 TABLETS BY MOUTH DAILY WITH DINNER 120 Tab 5    topiramate ER (TROKENDI XR) 100 mg capsule Take 1 capsule by mouth nightly 30 Cap 5    drospirenone-ethinyl estradiol (CJ) 3-0.02 mg tab Take 1 Tab by mouth daily. 3 Package 4    topiramate ER (TROKENDI XR) 25 mg capsule Take 1 Cap by mouth daily. (Patient not taking: Reported on 5/10/2018) 14 Cap 0    topiramate ER (TROKENDI XR) 50 mg capsule Take 1 Cap by mouth daily. (Patient not taking: Reported on 5/10/2018) 14 Cap 0      No Known Allergies  Social History   Substance Use Topics    Smoking status: Never Smoker    Smokeless tobacco: Never Used    Alcohol use 0.0 oz/week     0 Standard drinks or equivalent per week      Comment: Beer/wine-socially. Patient returns today for follow-up migraine headaches. She was started on Trokendi last visit. She is also using Relpax for abortive therapy. Last visit she was having 10-15 headache days per month. Today she reports she has had improvement in her headache frequency. She has found the headaches to be more easily treated. No recurrence. The number of headaches is decreased. She has had some side effects with the Trokendi. She endorses taste disturbance with carbonated beverages as well as tingling and some cognitive side effects.   She acknowledges that Tyler Saleem, our nurse practitioner, discussed these potential side effects with her. She does note that overall the reduction in headache frequency and intensity justifies the side effects. We discussed options today. We discussed increasing potassium content of her diet as that can help the tingling. She typically only gets that when running and we discussed the fact that Trokendi is a carbonic anhydrase inhibitor, week but is a carbonic anhydrase inhibitor, and that with the hyperventilation that occurs with running the increased sensation of tingling is not unexpected. Discussed that the taste disturbance sometimes goes away but sometimes does not. Discussed other issues as well in terms of potential side effects as well as the risk benefit ratio. At the end of our discussion today we decided to continue the current course of her Relpax and/or Treximet for treatment of an acute migraine and we will continue the Trokendi for now. She will continue this for the next 2 months and we will schedule her in for 2 month appointment. If the side effects are better or if she continues to believe that the side effects are tolerable and better than the headaches i.e. she would want to continue, then she will call and cancel that appointment and make an appointment for November which would be a routine six-month follow-up. Certainly if she has trouble in the interim we will see her sooner. Nice discussion today. Follow-up as noted    Total time: 25 min   Counseling / coordination time: 15 min   > 50% counseling / coordination?: Yes re: as documented above    Saadia Duron MD      Examination  Visit Vitals    /76 (BP 1 Location: Left arm, BP Patient Position: Sitting)    Pulse 78    Resp 18    Ht 5' 4\" (1.626 m)    Wt 74.1 kg (163 lb 6.4 oz)    LMP 05/04/2018    SpO2 98%    BMI 28.05 kg/m2         Impression/Plan          This note was created using voice recognition software. Despite editing, there may be syntax errors.    This note will not be viewable in [address]

## 2018-05-10 NOTE — MR AVS SNAPSHOT
[address] [place] [address] [address] [facility] [address] [place] [id number] [phone] Patient: Lenny Edmonds MRN: [id number] QSI:1/43/[id number] Visit Information Date & Time Provider Department Dept. Phone Encounter #  
 5/10/2018  3:40 PM Elliott Hand MD [facility] [phone] [id number] Follow-up Instructions Return in about 2 months (around 7/10/2018). Upcoming Health Maintenance Date Due Influenza Age 5 to Adult 8/1/2018 BREAST CANCER SCRN MAMMOGRAM 11/29/2018 PAP AKA CERVICAL CYTOLOGY 9/2/2020 DTaP/Tdap/Td series (2 - Td) 8/1/2023 Allergies as of 5/10/2018  Review Complete On: 5/10/2018 By: Elliott Hand MD  
 No Known Allergies Current Immunizations  Reviewed on 7/31/2013 Name Date Tdap 8/1/2013 Not reviewed this visit You Were Diagnosed With   
  
 Codes Comments Migraine without aura, intractable, with status migrainosus     ICD-10-CM: G43.011 
ICD-9-CM: 346.13 Vitals BP Pulse Resp Height(growth percentile) Weight(growth percentile) LMP  
 114/76 (BP 1 Location: Left arm, BP Patient Position: Sitting) 78 18 5' 4\" (1.626 m) 163 lb 6.4 oz (74.1 kg) 05/04/2018 SpO2 BMI OB Status Smoking Status 98% 28.05 kg/m2 Having regular periods Never Smoker Vitals History BMI and BSA Data Body Mass Index Body Surface Area 28.05 kg/m 2 1.83 m 2 Preferred Pharmacy Pharmacy Name Phone [id number] [address], [address] [phone] Your Updated Medication List  
  
   
This list is accurate as of 5/10/18  4:10 PM.  Always use your most recent med list.  
  
  
  
  
 drospirenone-ethinyl estradiol 3-0.02 mg Tab Commonly known as:  CJ Take 1 Tab by mouth daily. eletriptan 40 mg tablet Commonly known as:  RELPAX  
1 at HA onset and repeat in 2 hours if needed. Max 2 in 24 hours metFORMIN  mg tablet Commonly known as:  GLUCOPHAGE XR  
TAKE 4 TABLETS BY MOUTH DAILY WITH DINNER  
  
 topiramate  mg capsule Commonly known as:  TROKENDI XR Take 1 capsule by mouth nightly Follow-up Instructions Return in about 2 months (around 7/10/2018). Introducing [facility]! Dear Griffith Curling: Thank you for requesting a [employer] account. Our records indicate that you already have an active [employer] account. You can access your account anytime at [email or web address] Did you know that you can access your hospital and ER discharge instructions at any time in [employer]? You can also review all of your test results from your hospital stay or ER visit. Additional Information If you have questions, please visit the Frequently Asked Questions section of the [employer] website at [email or web address] Remember, [employer] is NOT to be used for urgent needs. For medical emergencies, dial 911. Now available from your iPhone and Android! Please provide this summary of care documentation to your next provider. Your primary care clinician is listed as Mario Alberto Choudhary. If you have any questions after today's visit, please call [phone].

## 2018-05-10 NOTE — PROGRESS NOTES
Chief Complaint   Patient presents with    Migraine     states rafael has helped with frequency of migraines but not crazy about SE including tingling in feet, inability to drink anything carbonated, and tired     Coordination of Care Questions    1. Have you been to the ER, urgent care clinic since your last visit? No       Hospitalized since your last visit? No    2. Have you seen or consulted any other health care providers outside of the [address] [place] [address] since your last visit? Include any pap smears or colon screening.  No

## 2018-05-11 DIAGNOSIS — G43.011 MIGRAINE WITHOUT AURA, INTRACTABLE, WITH STATUS MIGRAINOSUS: ICD-10-CM

## 2018-05-16 DIAGNOSIS — G43.011 MIGRAINE WITHOUT AURA, INTRACTABLE, WITH STATUS MIGRAINOSUS: ICD-10-CM

## 2018-07-23 ENCOUNTER — OFFICE VISIT (OUTPATIENT)
Dept: NEUROLOGY | Age: 43
End: 2018-07-23

## 2018-07-23 VITALS
WEIGHT: 162.4 LBS | BODY MASS INDEX: 27.72 KG/M2 | DIASTOLIC BLOOD PRESSURE: 70 MMHG | HEART RATE: 84 BPM | HEIGHT: 64 IN | SYSTOLIC BLOOD PRESSURE: 108 MMHG | TEMPERATURE: 98 F | OXYGEN SATURATION: 99 %

## 2018-07-23 DIAGNOSIS — G43.011 MIGRAINE WITHOUT AURA, INTRACTABLE, WITH STATUS MIGRAINOSUS: Primary | ICD-10-CM

## 2018-07-23 RX ORDER — SUMATRIPTAN 3 MG/1
INJECTION, SOLUTION SUBCUTANEOUS
Qty: 8 SYRINGE | Refills: 3 | Status: SHIPPED | OUTPATIENT
Start: 2018-07-23 | End: 2022-01-13

## 2018-07-23 NOTE — MR AVS SNAPSHOT
[facility] [address] [place] [id number] [phone] Patient: Gerhardt Gillis MRN: [id number] Visit Information Date & Time Provider Department Dept. Phone Encounter #  
 7/23/2018  3:20 PM Miguel Ángel Roland MD [facility] Neurology [facility] [phone] [id number] Follow-up Instructions Return in about 3 months (around 10/23/2018). Your Appointments 10/29/2018  9:40 AM  
Follow Up with Miguel Ángel Roland MD  
[facility] Appt Note: follow up headaches  pj  
 [address] [place] [id number] [phone]  
  
   
 [facility] [address] [id number] [address] Upcoming Health Maintenance Date Due Influenza Age 5 to Adult 8/1/2018 BREAST CANCER SCRN MAMMOGRAM 11/29/2018 PAP AKA CERVICAL CYTOLOGY 9/2/2020 DTaP/Tdap/Td series (2 - Td) 8/1/2023 Allergies as of 7/23/2018  Review Complete On: 7/23/2018 By: Miguel Ángel Roland MD  
 No Known Allergies Current Immunizations  Reviewed on 7/31/2013 Name Date Tdap 8/1/2013 Not reviewed this visit Vitals BP Pulse Temp Height(growth percentile) Weight(growth percentile) SpO2  
 108/70 84 98 °F (36.7 °C) 5' 4\" (1.626 m) 162 lb 6.4 oz (73.7 kg) 99% BMI OB Status Smoking Status 27.88 kg/m2 Having regular periods Never Smoker BMI and BSA Data Body Mass Index Body Surface Area  
 27.88 kg/m 2 1.82 m 2 Preferred Pharmacy Pharmacy Name Phone [id number] [address], [address] [phone] Your Updated Medication List  
  
   
This list is accurate as of 7/23/18  3:57 PM.  Always use your most recent med list.  
  
  
  
  
 drospirenone-ethinyl estradiol 3-0.02 mg Tab Commonly known as:  CJ Take 1 Tab by mouth daily. eletriptan 40 mg tablet Commonly known as:  RELPAX  
1 at HA onset and repeat in 2 hours if needed. Max 2 in 24 hours  
  
 erenumab-aooe 70 mg/mL Atin Commonly known as:  AIMOVIG AUTOINJECTOR  
70 mg by SubCUTAneous route every month.  
  
 metFORMIN  mg tablet Commonly known as:  GLUCOPHAGE XR  
TAKE 4 TABLETS BY MOUTH DAILY WITH DINNER  
  
 topiramate  mg capsule Commonly known as:  TROKENDI XR Take 1 capsule by mouth nightly ZEMBRACE SYMTOUCH 3 mg/0.5 mL Pnij Generic drug:  SUMAtriptan succinate 1 at HA onset and repeat in 1 hour x 1 prn up to 4 in 24 hours Prescriptions Printed Refills  
 erenumab-aooe (AIMOVIG AUTOINJECTOR) 70 mg/mL atIn 3 Si mg by SubCUTAneous route every month. Class: Print Route: SubCUTAneous Prescriptions Sent to Pharmacy Refills ZEMBRACE SYMTOUCH 3 mg/0.5 mL pnij 3 Si at HA onset and repeat in 1 hour x 1 prn up to 4 in 24 hours Class: Normal  
 Pharmacy: [address] [place] [address] #: [phone] Follow-up Instructions Return in about 3 months (around 10/23/2018). Patient Instructions Start taking the Trokendi every other day for about a week or so and if there is no significant increase in her headaches go ahead and stop it. If the headaches increase then go back to the daily dose until you start the Aimovig Complete the paperwork for Aimovig and wants you receive it in the mail, start using Aimovig 70 mg 1 injection monthly for the prevention of migraine For abortive therapy for your migraines start using Zembrace injections 1 at headache onset and repeat in 1 hour if needed. You can use up to 4 injections in 24 hours. The side effect potential is the same as Relpax although perhaps a bit more common as it is an injectable.   Sometimes the insurance companies want you to try the generic 6 mg injection first and if that is the case, we will call a prescription for the 6 mg size and you certainly can try that but it has more side effect potential.  We will call that if that is needed and you can pick it up and let us know and then we will call the 3 mg Zembrace injection we want you to use and then you can pick it up because the insurance company just wants to see that a claim has been made for the 6 mg size. Follow-up in 3 months Introducing [facility] & [facility] SERVICES! Dear Glynn Houston: Thank you for requesting a Usound account. Our records indicate that you already have an active Usound account. You can access your account anytime at [email or web address] Did you know that you can access your hospital and ER discharge instructions at any time in Usound? You can also review all of your test results from your hospital stay or ER visit. Additional Information If you have questions, please visit the Frequently Asked Questions section of the Usound website at [email or web address] Remember, Usound is NOT to be used for urgent needs. For medical emergencies, dial 911. Now available from your iPhone and Android! Please provide this summary of care documentation to your next provider. Your primary care clinician is listed as Bret Bentley. If you have any questions after today's visit, please call [phone].

## 2018-07-23 NOTE — PATIENT INSTRUCTIONS
Start taking the Trokendi every other day for about a week or so and if there is no significant increase in her headaches go ahead and stop it. If the headaches increase then go back to the daily dose until you start the Aimovig    Complete the paperwork for Aimovig and wants you receive it in the mail, start using Aimovig 70 mg 1 injection monthly for the prevention of migraine    For abortive therapy for your migraines start using Zembrace injections 1 at headache onset and repeat in 1 hour if needed. You can use up to 4 injections in 24 hours. The side effect potential is the same as Relpax although perhaps a bit more common as it is an injectable. Sometimes the insurance companies want you to try the generic 6 mg injection first and if that is the case, we will call a prescription for the 6 mg size and you certainly can try that but it has more side effect potential.  We will call that if that is needed and you can pick it up and let us know and then we will call the 3 mg Zembrace injection we want you to use and then you can pick it up because the insurance company just wants to see that a claim has been made for the 6 mg size.     Follow-up in 3 months

## 2018-07-23 NOTE — PROGRESS NOTES
[facility] NEUROLOGY [address]   [place] [address]   [address]   [phone] [facility]   [phone] Fax               Chief Complaint   Patient presents with    Head Pain     follow up     Current Outpatient Prescriptions   Medication Sig Dispense Refill    topiramate ER (TROKENDI XR) 100 mg capsule Take 1 capsule by mouth nightly 90 Cap 3    eletriptan (RELPAX) 40 mg tablet 1 at HA onset and repeat in 2 hours if needed. Max 2 in 24 hours 9 Tab 5    metFORMIN ER (GLUCOPHAGE XR) 500 mg tablet TAKE 4 TABLETS BY MOUTH DAILY WITH DINNER 120 Tab 5    drospirenone-ethinyl estradiol (CJ) 3-0.02 mg tab Take 1 Tab by mouth daily. 3 Package 4      No Known Allergies  Social History   Substance Use Topics    Smoking status: Never Smoker    Smokeless tobacco: Never Used    Alcohol use 0.0 oz/week     0 Standard drinks or equivalent per week      Comment: Beer/wine-socially. Patient returns today for follow-up intractable migraine headaches. Since I saw her last she continued with the Trokendi. She is having some diarrhea with that. Also she is not having good headache relief. She has had 6 migraines in June alone. She has not been able to abort them successfully with the Relpax. They are lasting 3 days. Really just not having any relief. No focal deficits with them. We reviewed her previous preventatives. Reviewed her previous abortive agents. Discussed alternatives today. Examination  Visit Vitals    /70    Pulse 84    Temp 98 °F (36.7 °C)    Ht 5' 4\" (1.626 m)    Wt 73.7 kg (162 lb 6.4 oz)    SpO2 99%    BMI 27.88 kg/m2     She looks well. Awake alert oriented and conversant. Normal speech-language cognition attention. No ataxia. Steady gait. Impression/Plan  Intractable migraine headaches with failure of multiple preventative meds and failure of multiple abortive meds. We discussed all of her options today.   We discussed migraine pathophysiology. We discussed the new CG RP inhibitor, Aimovig, that is been approved. We discussed using an injectable abortive agent as well. We discussed the mechanism of action of both of these drugs. We discussed injection. I demonstrated the injectors for both the Zembrace 3 mg injector and discussed administration, potential side effects potential benefits and alternatives. Did the same with Aimovig. Discussed the rationale using these. At the end of our discussion were going to go ahead and institute 70 mg Aimovig 1 injection monthly and discussed the expectation for preventative. We will go ahead and start to wean the topiramate given the fact that is giving her side effects and not been effective. If she finds that the headaches start to come on with more of a vengeance and she will go back on that. Written directions given today. For abortive therapy use Zembrace 3 mg injection and will use a 3 mg specifically to try to avoid side effects. Follow-up in 3 months which will give us time to get onto the Aimovig for a 2 month period of time to see how that has done. Total time: 25 min   Counseling / coordination time: 15 min   > 50% counseling / coordination?: Yes re: as documented above      Estee Evans MD    This note was created using voice recognition software. Despite editing, there may be syntax errors. This note will not be viewable in [address]

## 2018-07-23 NOTE — PROGRESS NOTES
Patient here for follow up on Migraines , Ruben is not working now , causing to be fitzgerald , and Relpax does not help with onset . Since June 5-6 migraines. Lasting 3 days.

## 2018-11-06 ENCOUNTER — TELEPHONE (OUTPATIENT)
Dept: NEUROLOGY | Age: 43
End: 2018-11-06

## 2018-12-07 ENCOUNTER — TELEPHONE (OUTPATIENT)
Dept: OBGYN CLINIC | Age: 43
End: 2018-12-07

## 2018-12-07 RX ORDER — DROSPIRENONE AND ETHINYL ESTRADIOL 0.02-3(28)
1 KIT ORAL DAILY
Qty: 3 PACKAGE | Refills: 4 | Status: CANCELLED | OUTPATIENT
Start: 2018-12-07

## 2018-12-10 RX ORDER — DROSPIRENONE AND ETHINYL ESTRADIOL 0.02-3(28)
KIT ORAL
Qty: 28 TAB | Refills: 0 | Status: SHIPPED | OUTPATIENT
Start: 2018-12-10 | End: 2018-12-26 | Stop reason: SDUPTHER

## 2018-12-10 RX ORDER — METFORMIN HYDROCHLORIDE 500 MG/1
TABLET, EXTENDED RELEASE ORAL
Qty: 120 TAB | Refills: 0 | Status: SHIPPED | OUTPATIENT
Start: 2018-12-10 | End: 2018-12-26 | Stop reason: SDUPTHER

## 2018-12-10 NOTE — TELEPHONE ENCOUNTER
44year old patient last seen in the office on 9/27/17 and has appointment for AE on 12/26/18. Prescription sent as per MD order to patient preferred pharmacy for one month supply to get her to her scheduled appointment.

## 2018-12-10 NOTE — TELEPHONE ENCOUNTER
44year old patient last seen in the office on 9/27/18     Patient has appointment on 12/26/18.     ? Stevphen Lack for this nurse to refill the prescription requested for one month supply to get patient to her scheduled appointment.     Please advise

## 2018-12-26 ENCOUNTER — OFFICE VISIT (OUTPATIENT)
Dept: OBGYN CLINIC | Age: 43
End: 2018-12-26

## 2018-12-26 VITALS
HEIGHT: 64 IN | WEIGHT: 169.6 LBS | BODY MASS INDEX: 28.95 KG/M2 | SYSTOLIC BLOOD PRESSURE: 116 MMHG | DIASTOLIC BLOOD PRESSURE: 72 MMHG

## 2018-12-26 DIAGNOSIS — Z01.411 ENCOUNTER FOR GYNECOLOGICAL EXAMINATION (GENERAL) (ROUTINE) WITH ABNORMAL FINDINGS: Primary | ICD-10-CM

## 2018-12-26 DIAGNOSIS — E28.2 PCOS (POLYCYSTIC OVARIAN SYNDROME): ICD-10-CM

## 2018-12-26 RX ORDER — DROSPIRENONE AND ETHINYL ESTRADIOL 0.02-3(28)
1 KIT ORAL DAILY
Qty: 3 PACKAGE | Refills: 4 | Status: SHIPPED | OUTPATIENT
Start: 2018-12-26 | End: 2019-12-30 | Stop reason: SDUPTHER

## 2018-12-26 RX ORDER — METFORMIN HYDROCHLORIDE 500 MG/1
TABLET, EXTENDED RELEASE ORAL
Qty: 120 TAB | Refills: 12 | Status: SHIPPED | OUTPATIENT
Start: 2018-12-26 | End: 2020-01-13

## 2018-12-26 NOTE — PROGRESS NOTES
[facility]  [email or web address]  [phone]    Jazmine Holland MD, [address] [facility]       Annual Gynecologic Exam  WWE 40-60  Chief Complaint   Patient presents with    Well Woman       Anna Burdick is a 44 y.o.  [facility]  female who presents for an annual exam.  Patient's last menstrual period was 12/15/2018 (approximate). .    She does not report additional concerns today. Menstrual status:  Her periods are moderate. She does not report dysmenorrhea/painful menses. She does not report irregular bleeding. Sexual history and Contraception:  Social History     Substance and Sexual Activity   Sexual Activity Yes    Partners: Male    Birth control/protection: Pill       She does not reports new sexual partner(s) in the last year. The patient does not request STD testing. Preventive Medicine History:  Her most recent Pap smear result: normal was obtained in 2015    Her most recent HR HPV screen was Negative obtained in     She does not have a history of VICKY 2, 3 or cervical cancer. Breast health:  Last mammogram: approximate date 18 and was normal.   A mammogram was scheduled for today. Breast cancer family updated: see FH. Bone health: Manny Matute She does not have a history of osteopenia/osteoporosis. Osteoporosis family history updated: see .      Past Medical History:   Diagnosis Date    Encounter for screening mammogram for breast cancer 2016; 17    Normal; Normal    Headache     HX OTHER MEDICAL     infertility,unscpeified    Migraine headache     without aura    Pap smear for cervical cancer screening 3/11/10; 9/2/15    neg,hpv neg; Negative, HPV negative    PCOS (polycystic ovarian syndrome)      OB History    Para Term  AB Living   2 1 1   1 1   SAB TAB Ectopic Molar Multiple Live Births   1         1      # Outcome Date GA Lbr Kaiden/2nd Weight Sex Delivery Anes PTL Lv   2 SAB            1 Term FARHAN          Past Surgical History:   Procedure Laterality Date    HX OTHER SURGICAL  5/29/12    benign     Family History   Problem Relation Age of Onset    Elevated Lipids Mother     No Known Problems Father     Headache Brother      Social History     Socioeconomic History    Marital status:      Spouse name: Not on file    Number of children: Not on file    Years of education: Not on file    Highest education level: Not on file   Social Needs    Financial resource strain: Not on file    Food insecurity - worry: Not on file    Food insecurity - inability: Not on file   Exercise the World needs - medical: Not on file   Exercise the World needs - non-medical: Not on file   Occupational History    Not on file   Tobacco Use    Smoking status: Never Smoker    Smokeless tobacco: Never Used   Substance and Sexual Activity    Alcohol use: Yes     Alcohol/week: 0.0 oz     Comment: Beer/wine-socially.  Drug use: No    Sexual activity: Yes     Partners: Male     Birth control/protection: Pill   Other Topics Concern    Not on file   Social History Narrative    Not on file       No Known Allergies    Current Outpatient Medications   Medication Sig    drospirenone-ethinyl estradiol (YOSELIN, 28,) 3-0.02 mg tab Take 1 Tab by mouth daily.  metFORMIN ER (GLUCOPHAGE XR) 500 mg tablet TAKE 4 TABLETS BY MOUTH DAILY WITH DINNER    erenumab-aooe (AIMOVIG AUTOINJECTOR) 70 mg/mL atIn 70 mg by SubCUTAneous route every month.  ZEMBRACE SYMTOUCH 3 mg/0.5 mL pnij 1 at HA onset and repeat in 1 hour x 1 prn up to 4 in 24 hours     No current facility-administered medications for this visit.         Patient Active Problem List   Diagnosis Code    Hirsutism L68.0    PCOS (polycystic ovarian syndrome) E28.2    Irregular menses N92.6    Migraine without aura, intractable, with status migrainosus G43.011       Review of Systems - History obtained from the patient  Constitutional: negative for weight loss, fever, night sweats  HEENT: negative for hearing loss, earache, congestion, snoring, sorethroat  CV: negative for chest pain, palpitations, edema  Resp: negative for cough, shortness of breath, wheezing  GI: negative for change in bowel habits, abdominal pain, black or bloody stools  : negative for frequency, dysuria, hematuria, vaginal discharge  MSK: negative for back pain, joint pain, muscle pain  Breast: negative for breast lumps, nipple discharge, galactorrhea  Skin :negative for itching, rash, hives  Neuro: negative for dizziness, headache, confusion, weakness  Psych: negative for anxiety, depression, change in mood  Heme/lymph: negative for bleeding, bruising, pallor    Physical Exam  Visit Vitals  /72   Ht 5' 4\" (1.626 m)   Wt 169 lb 9.6 oz (76.9 kg)   LMP 12/15/2018 (Approximate)   BMI 29.11 kg/m²       Constitutional  · Appearance: well-nourished, well developed, alert, in no acute distress    HENT  · Head and Face: appears normal    Neck  · Inspection/Palpation: normal appearance, no masses or tenderness  · Lymph Nodes: no lymphadenopathy present  · Thyroid: gland size normal, nontender, no nodules or masses present on palpation    Chest  · Respiratory Effort: breathing unlabored  · Auscultation: normal breath sounds    Cardiovascular  · Heart:  · Auscultation: regular rate and rhythm without murmur    Breasts  · Inspection of Breasts: breasts symmetrical, no skin changes, no discharge present, nipple appearance normal, no skin retraction present  · Palpation of Breasts and Axillae: no masses present on palpation, no breast tenderness  · Axillary Lymph Nodes: no lymphadenopathy present    Gastrointestinal  · Abdominal Examination: abdomen non-tender to palpation, normal bowel sounds, no masses present  · Liver and spleen: no hepatomegaly present, spleen not palpable  · Hernias: no hernias identified    Genitourinary  · External Genitalia: normal appearance for age, no discharge present, no tenderness present, no inflammatory lesions present, no masses present, without atrophy present  · Vagina: normal vaginal vault without central or paravaginal defects, no discharge present, no inflammatory lesions present, no masses present  · Bladder: non-tender to palpation  · Urethra: appears normal  · Cervix: normal   · Uterus: normal size, shape and consistency  · Adnexa: no adnexal tenderness present, no adnexal masses present  · Perineum: perineum within normal limits, no evidence of trauma, no rashes or skin lesions present  · Anus: anus within normal limits, no hemorrhoids present  · Inguinal Lymph Nodes: no lymphadenopathy present    Skin  · General Inspection: no rash, no lesions identified    Neurologic/Psychiatric  · Mental Status:  · Orientation: grossly oriented to person, place and time  · Mood and Affect: mood normal, affect appropriate    Assessment:  44 y.o.  for well woman exam  Encounter Diagnoses   Name Primary?  Encounter for gynecological examination (general) (routine) with abnormal findings Yes    PCOS (polycystic ovarian syndrome)        Plan:  The patient was counseled about diet, exercise, healthy lifestyle  We discussed current self breast exam and mammogram recommendations  We discussed current pap smear and HR HPV testing guidelines  We recommend follow up in one year for routine annual gynecologic exam or sooner if needed  We recommend follow up with a primary care physician for any chronic medical problems or non-gynecologic concerns    We discussed calcium/vitamin D/weight bearing exercise and osteoporosis prevention  Handouts were given to the patient  Discussed risks, benefits and alternatives of OCP/nuvaring/patch: including but not limited to dvt/pe/mi/cva/ca/gi risks. She is encouraged to read package insert and to follow up with me or her pharmacist with any questions or concerns.   Disc potential increase risks with buzz/everardo and interaction with other medications and potassium levels. Disc inc risk with ocp and migraine ha, pt denies aura/neuro sx and wants to continue  We discussed progesterone only and non hormonal options for contraception including but not limited to condoms, IUDs, Nexplanon, and depo provera. Continue metformin; reviewed labs, rec repeat next year or sooner prn. Rec stay up to date with cholesterol screening, pt will do with PCP. Disc inc risks with pcos       Folllow up:  [x] return for annual well woman exam in one year or sooner if she is having problems  [] follow up and ultrasound  [x] mammogram  [] 6 months  [] 3 months  [] 1 month    Orders Placed This Encounter    drospirenone-ethinyl estradiol (YOSELIN, 28,) 3-0.02 mg tab    metFORMIN ER (GLUCOPHAGE XR) 500 mg tablet    PAP IG, HPV AND RFX HPV 33/26,17([id number])       Results for orders placed or performed in visit on 12/26/18   [facility] MAMMO BI SCREENING INCL CAD    Narrative    STUDY: Bilateral digital screening mammogram    INDICATION:  Screening. COMPARISON: 2017, 2016    BREAST COMPOSITION:  There are scattered areas of fibroglandular density. FINDINGS: Bilateral digital screening mammography was performed and is  interpreted in conjunction with a computer assisted detection (CAD) system. No  suspicious masses or calcifications are identified. There has been no  significant change. Impression    IMPRESSION:  BI-RADS 1: Negative. No mammographic evidence of malignancy. RECOMMENDATIONS:  Next screening mammogram is recommended in one year. The patient will be notified of these results.

## 2018-12-26 NOTE — PATIENT INSTRUCTIONS

## 2018-12-29 LAB
CYTOLOGIST CVX/VAG CYTO: NORMAL
CYTOLOGY CVX/VAG DOC THIN PREP: NORMAL
CYTOLOGY HISTORY:: NORMAL
DX ICD CODE: NORMAL
HPV I/H RISK 1 DNA CVX QL PROBE+SIG AMP: NEGATIVE
Lab: NORMAL
OTHER STN SPEC: NORMAL
PATH REPORT.FINAL DX SPEC: NORMAL
STAT OF ADQ CVX/VAG CYTO-IMP: NORMAL

## 2019-01-24 ENCOUNTER — OFFICE VISIT (OUTPATIENT)
Dept: NEUROLOGY | Age: 44
End: 2019-01-24

## 2019-01-24 VITALS
RESPIRATION RATE: 16 BRPM | HEIGHT: 64 IN | OXYGEN SATURATION: 99 % | HEART RATE: 81 BPM | WEIGHT: 170 LBS | SYSTOLIC BLOOD PRESSURE: 104 MMHG | BODY MASS INDEX: 29.02 KG/M2 | DIASTOLIC BLOOD PRESSURE: 72 MMHG | TEMPERATURE: 99 F

## 2019-01-24 DIAGNOSIS — G43.011 MIGRAINE WITHOUT AURA, INTRACTABLE, WITH STATUS MIGRAINOSUS: Primary | ICD-10-CM

## 2019-01-24 RX ORDER — CEFUROXIME AXETIL 250 MG/1
TABLET ORAL
COMMUNITY
Start: 2019-01-15 | End: 2022-02-18

## 2019-01-24 NOTE — PROGRESS NOTES
[facility] [place] [facility] and [address] [facility] at [phone] [place], [address] [facility] [address] [facility], [address] [place] [address] Chief Complaint Patient presents with  
 Headache  
  follow up appointment Current Outpatient Medications Medication Sig Dispense Refill  drospirenone-ethinyl estradiol (YOSELIN, 28,) 3-0.02 mg tab Take 1 Tab by mouth daily. 3 Package 4  
 metFORMIN ER (GLUCOPHAGE XR) 500 mg tablet TAKE 4 TABLETS BY MOUTH DAILY WITH DINNER 120 Tab 12  
 erenumab-aooe (AIMOVIG AUTOINJECTOR) 70 mg/mL atIn 70 mg by SubCUTAneous route every month. 1 Syringe 3  ZEMBRACE SYMTOUCH 3 mg/0.5 mL pnij 1 at HA onset and repeat in 1 hour x 1 prn up to 4 in 24 hours 8 Syringe 3 No Known Allergies Social History Tobacco Use  Smoking status: Never Smoker  Smokeless tobacco: Never Used Substance Use Topics  Alcohol use: Yes Alcohol/week: 0.0 oz  
  Comment: Beer/wine-socially.  Drug use: No  
 
Patient returns today for follow-up migraine headaches. Last visit we instituted Aimovig and continued with the [facility] for abortive therapy. Since that time she indicates she has had improvement in her HA to the point that she cannot remember the last time she had a migraine. If she feels like one may be coming on just a small amount of headache she will use of the Zembrace and that takes care of her issue. She is quite happy. No side effects. Examination Visit Vitals Ht 5' 4\" (1.626 m) BMI 29.11 kg/m² Very pleasant lady who has appropriate dress grooming and affect. Interactive. No icterus. No edema. No ataxia. Steady gait. Impression/Plan Intractable migraine headaches much improved with Aimovig. Continue this. I gave her a new co-pay card.   She will call or send a message to the portal when she needs refills on the Aimovig or the Zembrace. We will plan to see her in 6 months Gila Thomas MD 
 
 
This note was created using voice recognition software. Despite editing, there may be syntax errors. This note will not be viewable in [address]

## 2019-01-24 NOTE — PROGRESS NOTES
Patient identified with 2 ID's, Name and  Joy Paniagua is a 44 y.o. female Chief Complaint Patient presents with  
 Headache  
  follow up appointment 1. Have you been to the ER, urgent care clinic since your last visit? Hospitalized since your last visit? No  
 
2. Have you seen or consulted any other health care providers outside of the [address] [place] [address] since your last visit? Include any pap smears or colon screening. No  
 
 
Visit Vitals /72 (BP 1 Location: Left arm, BP Patient Position: Sitting) Pulse 81 Temp 99 °F (37.2 °C) (Oral) Resp 16 Ht 5' 4\" (1.626 m) Wt 77.1 kg (170 lb) SpO2 99% BMI 29.18 kg/m² Medication Reconciliation reviewed with patient on this date PHQ over the last two weeks 2019 Little interest or pleasure in doing things Not at all Feeling down, depressed, irritable, or hopeless Not at all Total Score PHQ 2 0 Learning Assessment 1/3/2018 PRIMARY LEARNER Patient PRIMARY LANGUAGE ENGLISH  
LEARNER PREFERENCE PRIMARY READING  
ANSWERED BY [place] RELATIONSHIP SELF

## 2019-04-16 DIAGNOSIS — G43.011 MIGRAINE WITHOUT AURA, INTRACTABLE, WITH STATUS MIGRAINOSUS: Primary | ICD-10-CM

## 2019-04-16 NOTE — TELEPHONE ENCOUNTER
Pt is using the sumatriptan 6 mg inj. LVM, last office note said to continue Zembrace, request is for sumatriptan 6 mg inj.   Need clarification which one pt is using    May fill 14 [facility], NOV 7/22/19 with Dr. Chris Parikhs

## 2019-04-25 RX ORDER — CEFUROXIME AXETIL 250 MG/1
TABLET ORAL
Qty: 6 KIT | Refills: 3 | OUTPATIENT
Start: 2019-04-25

## 2019-07-15 DIAGNOSIS — G43.011 MIGRAINE WITHOUT AURA, INTRACTABLE, WITH STATUS MIGRAINOSUS: ICD-10-CM

## 2019-10-10 DIAGNOSIS — G43.011 MIGRAINE WITHOUT AURA, INTRACTABLE, WITH STATUS MIGRAINOSUS: ICD-10-CM

## 2019-10-10 RX ORDER — ERENUMAB-AOOE 70 MG/ML
INJECTION SUBCUTANEOUS
Qty: 3 ML | Refills: 4 | Status: SHIPPED | OUTPATIENT
Start: 2019-10-10 | End: 2020-02-19 | Stop reason: SDUPTHER

## 2019-11-25 ENCOUNTER — OFFICE VISIT (OUTPATIENT)
Dept: NEUROLOGY | Age: 44
End: 2019-11-25

## 2019-11-25 VITALS
OXYGEN SATURATION: 99 % | SYSTOLIC BLOOD PRESSURE: 102 MMHG | HEART RATE: 85 BPM | BODY MASS INDEX: 29.37 KG/M2 | WEIGHT: 172 LBS | RESPIRATION RATE: 16 BRPM | HEIGHT: 64 IN | DIASTOLIC BLOOD PRESSURE: 76 MMHG

## 2019-11-25 DIAGNOSIS — G43.011 MIGRAINE WITHOUT AURA, INTRACTABLE, WITH STATUS MIGRAINOSUS: Primary | ICD-10-CM

## 2019-11-25 NOTE — PROGRESS NOTES
[facility] and [address] at [facility]rology [facility] at [address] [facility], [address] [facility] [address] [facility], [address] [place] [address]   [phone]              Chief Complaint   Patient presents with    Migraine     doing very well, does not recall last full-blown migraine     Current Outpatient Medications   Medication Sig Dispense Refill    AIMOVIG AUTOINJECTOR 70 mg/mL injection INJECT 1 ML UNDER THE SKIN EVERY MONTH 3 mL 4    SUMAtriptan succinate 6 mg/0.5 mL kit       drospirenone-ethinyl estradiol (YOSELIN, 28,) 3-0.02 mg tab Take 1 Tab by mouth daily. 3 Package 4    metFORMIN ER (GLUCOPHAGE XR) 500 mg tablet TAKE 4 TABLETS BY MOUTH DAILY WITH DINNER 120 Tab 12    ZEMBRACE SYMTOUCH 3 mg/0.5 mL pnij 1 at HA onset and repeat in 1 hour x 1 prn up to 4 in 24 hours (Patient not taking: Reported on 1/24/2019) 8 Syringe 3      No Known Allergies  Social History     Tobacco Use    Smoking status: Never Smoker    Smokeless tobacco: Never Used   Substance Use Topics    Alcohol use: Yes     Alcohol/week: 0.0 standard drinks     Comment: Beer/wine-socially.  Drug use: No     Patient returns today in follow-up for headaches. I last saw her in January of this year. Her headaches were much improved with Aimovig. She was using Zembrace as abortive therapy. Today she returns in general follow-up and indicates she has been doing quite well. She cannot the last headache she is had. Tolerating medicine without difficulty. Says that the Carrie Ocnor has been life changing for her. She gets through her mail order and has had no difficulty. Examination  Visit Vitals  Ht 5' 4\" (1.626 m)   Wt 78 kg (172 lb)   BMI 29.52 kg/m²     Pleasant lady. Awake alert oriented and conversant. Normal speech and language. No ataxia. Steady gait    Impression/Plan  Intractable migraine headaches markedly improved with Aimovig. Continue with this. Follow with me in the summer unless circumstances dictate otherwise. Raffi Kerns MD      This note was created using voice recognition software. Despite editing, there may be syntax errors. This note will not be viewable in [address]

## 2019-12-30 ENCOUNTER — HOSPITAL ENCOUNTER (OUTPATIENT)
Dept: LAB | Age: 44
Discharge: HOME | End: 2019-12-30

## 2019-12-30 ENCOUNTER — OFFICE VISIT (OUTPATIENT)
Dept: OBGYN CLINIC | Age: 44
End: 2019-12-30

## 2019-12-30 VITALS
BODY MASS INDEX: 29.71 KG/M2 | WEIGHT: 174 LBS | DIASTOLIC BLOOD PRESSURE: 70 MMHG | SYSTOLIC BLOOD PRESSURE: 118 MMHG | HEIGHT: 64 IN

## 2019-12-30 DIAGNOSIS — E28.2 PCOS (POLYCYSTIC OVARIAN SYNDROME): ICD-10-CM

## 2019-12-30 DIAGNOSIS — Z01.419 ENCOUNTER FOR GYNECOLOGICAL EXAMINATION (GENERAL) (ROUTINE) WITHOUT ABNORMAL FINDINGS: Primary | ICD-10-CM

## 2019-12-30 LAB
HBA1C MFR BLD: 4.9 % (ref 4–5.6)
PROLACTIN SERPL-MCNC: 7.9 NG/ML
T4 FREE SERPL-MCNC: 0.9 NG/DL (ref 0.8–1.5)
TSH SERPL DL<=0.05 MIU/L-ACNC: 2.55 UIU/ML (ref 0.36–3.74)

## 2019-12-30 RX ORDER — DROSPIRENONE AND ETHINYL ESTRADIOL 0.02-3(28)
1 KIT ORAL DAILY
Qty: 3 PACKAGE | Refills: 4 | Status: SHIPPED | OUTPATIENT
Start: 2019-12-30 | End: 2020-12-30 | Stop reason: SDUPTHER

## 2019-12-30 NOTE — PATIENT INSTRUCTIONS

## 2019-12-30 NOTE — PROGRESS NOTES
[address] [facility]  [email or web address]  [phone]    Heladio Andrew MD, [address] [facility]       Annual Gynecologic Exam  WWE 40-60  Chief Complaint   Patient presents with    Well Woman       Sumanth Torres is a 38 y.o.  [facility]  female who presents for an annual exam.  Patient's last menstrual period was 2019 (exact date). .    She does not report additional concerns today. Is wondering about checking PCOS labs because having trouble losing weight. Menstrual status:  Her periods are regular cycles with OCPs. She does not report dysmenorrhea/painful menses. She does not report irregular bleeding. Sexual history and Contraception:  Social History     Substance and Sexual Activity   Sexual Activity Yes    Partners: Male    Birth control/protection: Pill       She does not reports new sexual partner(s) in the last year. The patient does not request STD testing. Preventive Medicine History:  Her most recent Pap smear result: normal was obtained in 2018    Her most recent HR HPV screen was Negative obtained in     She does not have a history of VICKY 2, 3 or cervical cancer. Breast health:  Last mammogram: was normal.   A mammogram was scheduled for today. Breast cancer family updated: see . Bone health: Luis Durán She does not have a history of osteopenia/osteoporosis. Osteoporosis family history updated: see .      Past Medical History:   Diagnosis Date    Encounter for screening mammogram for breast cancer 2016; 18; 19    Normal; Normal; Normal    Headache     HX OTHER MEDICAL     infertility,unscpeified    Migraine headache     without aura    Pap smear for cervical cancer screening 3/11/10; 9/2/15; 18    neg,hpv neg; Negative, HPV negative; Neg/HPV Neg    PCOS (polycystic ovarian syndrome)      OB History    Para Term  AB Living   2 1 1   1 1   SAB TAB Ectopic Molar Multiple Live Births   1 1      # Outcome Date GA Lbr Kaiden/2nd Weight Sex Delivery Anes PTL Lv   2 SAB            1 Term         FARHAN       Past Surgical History:   Procedure Laterality Date    HX OTHER SURGICAL  5/29/12    benign     Family History   Problem Relation Age of Onset    Elevated Lipids Mother     No Known Problems Father     Headache Brother      Social History     Socioeconomic History    Marital status:      Spouse name: Not on file    Number of children: Not on file    Years of education: Not on file    Highest education level: Not on file   Occupational History    Not on file   Social Needs    Financial resource strain: Not on file    Food insecurity:     Worry: Not on file     Inability: Not on file    Transportation needs:     Medical: Not on file     Non-medical: Not on file   Tobacco Use    Smoking status: Never Smoker    Smokeless tobacco: Never Used   Substance and Sexual Activity    Alcohol use: Yes     Alcohol/week: 0.0 standard drinks     Comment: Beer/wine-socially.     Drug use: No    Sexual activity: Yes     Partners: Male     Birth control/protection: Pill   Lifestyle    Physical activity:     Days per week: Not on file     Minutes per session: Not on file    Stress: Not on file   Relationships    Social connections:     Talks on phone: Not on file     Gets together: Not on file     Attends [employer] service: Not on file     Active member of club or organization: Not on file     Attends meetings of clubs or organizations: Not on file     Relationship status: Not on file    Intimate partner violence:     Fear of current or ex partner: Not on file     Emotionally abused: Not on file     Physically abused: Not on file     Forced sexual activity: Not on file   Other Topics Concern    Not on file   Social History Narrative    Not on file       No Known Allergies    Current Outpatient Medications   Medication Sig    drospirenone-ethinyl estradiol (Sienna Da Silva, 28,) 3-0.02 mg tab Take 1 Tab by mouth daily.  AIMOVIG AUTOINJECTOR 70 mg/mL injection INJECT 1 ML UNDER THE SKIN EVERY MONTH    metFORMIN ER (GLUCOPHAGE XR) 500 mg tablet TAKE 4 TABLETS BY MOUTH DAILY WITH DINNER    ZEMBRACE SYMTOUCH 3 mg/0.5 mL pnij 1 at HA onset and repeat in 1 hour x 1 prn up to 4 in 24 hours    SUMAtriptan succinate 6 mg/0.5 mL kit      No current facility-administered medications for this visit.         Patient Active Problem List   Diagnosis Code    Hirsutism L68.0    PCOS (polycystic ovarian syndrome) E28.2    Irregular menses N92.6    Migraine without aura, intractable, with status migrainosus G43.011       Review of Systems - History obtained from the patient  Constitutional: negative for weight loss, fever, night sweats  HEENT: negative for hearing loss, earache, congestion, snoring, sorethroat  CV: negative for chest pain, palpitations, edema  Resp: negative for cough, shortness of breath, wheezing  GI: negative for change in bowel habits, abdominal pain, black or bloody stools  : negative for frequency, dysuria, hematuria, vaginal discharge  MSK: negative for back pain, joint pain, muscle pain  Breast: negative for breast lumps, nipple discharge, galactorrhea  Skin :negative for itching, rash, hives  Neuro: negative for dizziness, headache, confusion, weakness  Psych: negative for anxiety, depression, change in mood  Heme/lymph: negative for bleeding, bruising, pallor      (WWE continued)     Physical Exam  Visit Vitals  /70   Ht 5' 4\" (1.626 m)   Wt 174 lb (78.9 kg)   LMP 12/20/2019 (Exact Date)   BMI 29.87 kg/m²       Constitutional  · Appearance: well-nourished, well developed, alert, in no acute distress    HENT  · Head and Face: appears normal    Neck  · Inspection/Palpation: normal appearance, no masses or tenderness  · Lymph Nodes: no lymphadenopathy present  · Thyroid: gland size normal, nontender, no nodules or masses present on palpation    Chest  · Respiratory Effort: breathing unlabored  · Auscultation: normal breath sounds    Cardiovascular  · Heart:  · Auscultation: regular rate and rhythm without murmur    Breasts  · Inspection of Breasts: breasts symmetrical, no skin changes, no discharge present, nipple appearance normal, no skin retraction present  · Palpation of Breasts and Axillae: no masses present on palpation, no breast tenderness  · Axillary Lymph Nodes: no lymphadenopathy present    Gastrointestinal  · Abdominal Examination: abdomen non-tender to palpation, normal bowel sounds, no masses present  · Liver and spleen: no hepatomegaly present, spleen not palpable  · Hernias: no hernias identified    Genitourinary  · External Genitalia: normal appearance for age, no discharge present, no tenderness present, no inflammatory lesions present, no masses present, without atrophy present  · Vagina: normal vaginal vault without central or paravaginal defects, no discharge present, no inflammatory lesions present, no masses present  · Bladder: non-tender to palpation  · Urethra: appears normal  · Cervix: normal   · Uterus: normal size, shape and consistency  · Adnexa: no adnexal tenderness present, no adnexal masses present  · Perineum: perineum within normal limits, no evidence of trauma, no rashes or skin lesions present  · Anus: anus within normal limits, no hemorrhoids present  · Inguinal Lymph Nodes: no lymphadenopathy present    Skin  · General Inspection: no rash, no lesions identified    Neurologic/Psychiatric  · Mental Status:  · Orientation: grossly oriented to person, place and time  · Mood and Affect: mood normal, affect appropriate    Assessment:  38 y.o.  for well woman exam  Encounter Diagnoses   Name Primary?     PCOS (polycystic ovarian syndrome)     Encounter for gynecological examination (general) (routine) without abnormal findings Yes       Plan:  The patient was counseled about diet, exercise, healthy lifestyle  We discussed current self breast exam and mammogram recommendations  We discussed current pap smear and HR HPV testing guidelines  We recommend follow up in one year for routine annual gynecologic exam or sooner if needed  We recommend follow up with a primary care physician for any chronic medical problems or non-gynecologic concerns    We discussed calcium/vitamin D/weight bearing exercise and osteoporosis prevention  Handouts were given to the patient  Discussed risks, benefits and alternatives of OCP/nuvaring/patch: including but not limited to dvt/pe/mi/cva/ca/gi risks and that smoking, increasing age and other health conditions can increase these risks. Pt wants to continue  labs     Folllow up:  [x] return for annual well woman exam in one year or sooner if she is having problems  [] follow up and ultrasound  [x] mammogram  [] 6 months  [] 3 months  [] 1 month    Orders Placed This Encounter    TESTOSTERONE, FREE & TOTAL    TSH 3RD GENERATION    T4, FREE    PROLACTIN    DHEA SULFATE    17-OH PROGESTERONE LCMS    HEMOGLOBIN A1C W/O EAG    drospirenone-ethinyl estradiol (Verlena Dillon, 28,) 3-0.02 mg tab       Results for orders placed or performed in visit on 12/30/19   [facility] MAMMO BI SCREENING INCL CAD    Narrative    STUDY: Bilateral digital screening mammogram    INDICATION:  Screening. COMPARISON: [id number]    BREAST COMPOSITION:  The breasts are heterogeneously dense, which may obscure  small masses. FINDINGS: Bilateral digital screening mammography was performed and is  interpreted in conjunction with a computer assisted detection (CAD) system. No  suspicious masses or calcifications are identified. There has been no  significant change. Impression    IMPRESSION:  BI-RADS 1: Negative. No mammographic evidence of malignancy. RECOMMENDATIONS:  Next screening mammogram is recommended in one year. Recommend tomosynthesis (3-D mammogram) for future screening given patient's  breast density.       The patient will be notified of these results.

## 2019-12-31 LAB
DHEA-S SERPL-MCNC: 160.3 UG/DL (ref 57.3–279.2)
TESTOST FREE SERPL-MCNC: 2 PG/ML (ref 0–4.2)
TESTOST SERPL-MCNC: 28 NG/DL (ref 8–48)

## 2020-01-02 LAB — 17OHP SERPL-MCNC: 10 NG/DL

## 2020-01-03 NOTE — PROGRESS NOTES
Patient aware of results and MD recommendations by arlet.     Patient Result Comments     Viewed by Marcelle Moncada on 12/31/2019  8:38 PM   Written by Luis A Griffith MD on 12/31/2019  7:02 PM

## 2020-01-12 ENCOUNTER — TELEPHONE (OUTPATIENT)
Dept: OBGYN CLINIC | Age: 45
End: 2020-01-12

## 2020-01-13 RX ORDER — METFORMIN HYDROCHLORIDE 500 MG/1
TABLET, EXTENDED RELEASE ORAL
Qty: 360 TAB | Refills: 4 | Status: SHIPPED | OUTPATIENT
Start: 2020-01-13 | End: 2020-12-31 | Stop reason: SDUPTHER

## 2020-01-13 NOTE — TELEPHONE ENCOUNTER
38year old patient last seen in the office on 12/30/19    ? ok ok to refill as pended by the pharmacy.     Please advise

## 2020-02-19 DIAGNOSIS — G43.011 MIGRAINE WITHOUT AURA, INTRACTABLE, WITH STATUS MIGRAINOSUS: ICD-10-CM

## 2020-02-20 ENCOUNTER — PATIENT MESSAGE (OUTPATIENT)
Dept: NEUROLOGY | Age: 45
End: 2020-02-20

## 2020-02-20 DIAGNOSIS — G43.011 MIGRAINE WITHOUT AURA, INTRACTABLE, WITH STATUS MIGRAINOSUS: ICD-10-CM

## 2020-02-25 NOTE — TELEPHONE ENCOUNTER
From: Susan Morales  To: Ja Choudhary MD  Sent: 2/20/2020 11:38 AM EST  Subject: Prescription Question    Good Morning,    I am trying to get my Aimovig refilled, however my insurance is now requiring pre-authorization. Can you help?

## 2020-08-26 DIAGNOSIS — G43.011 MIGRAINE WITHOUT AURA, INTRACTABLE, WITH STATUS MIGRAINOSUS: ICD-10-CM

## 2020-08-26 RX ORDER — ERENUMAB-AOOE 70 MG/ML
70 INJECTION SUBCUTANEOUS
Qty: 1 ML | Refills: 5 | Status: SHIPPED | OUTPATIENT
Start: 2020-08-26 | End: 2021-03-17

## 2020-12-30 NOTE — PATIENT INSTRUCTIONS
Well Visit, Ages 25 to 48: Care Instructions Your Care Instructions Physical exams can help you stay healthy. Your doctor has checked your overall health and may have suggested ways to take good care of yourself. He or she also may have recommended tests. At home, you can help prevent illness with healthy eating, regular exercise, and other steps. Follow-up care is a key part of your treatment and safety. Be sure to make and go to all appointments, and call your doctor if you are having problems. It's also a good idea to know your test results and keep a list of the medicines you take. How can you care for yourself at home? · Reach and stay at a healthy weight. This will lower your risk for many problems, such as obesity, diabetes, heart disease, and high blood pressure. · Get at least 30 minutes of physical activity on most days of the week. Walking is a good choice. You also may want to do other activities, such as running, swimming, cycling, or playing tennis or team sports. Discuss any changes in your exercise program with your doctor. · Do not smoke or allow others to smoke around you. If you need help quitting, talk to your doctor about stop-smoking programs and medicines. These can increase your chances of quitting for good. · Talk to your doctor about whether you have any risk factors for sexually transmitted infections (STIs). Having one sex partner (who does not have STIs and does not have sex with anyone else) is a good way to avoid these infections. · Use birth control if you do not want to have children at this time. Talk with your doctor about the choices available and what might be best for you. · Protect your skin from too much sun. When you're outdoors from 10 a.m. to 4 p.m., stay in the shade or cover up with clothing and a hat with a wide brim. Wear sunglasses that block UV rays. Even when it's cloudy, put broad-spectrum sunscreen (SPF 30 or higher) on any exposed skin. · See a dentist one or two times a year for checkups and to have your teeth cleaned. · Wear a seat belt in the car. Follow your doctor's advice about when to have certain tests. These tests can spot problems early. For everyone · Cholesterol. Have the fat (cholesterol) in your blood tested after age 21. Your doctor will tell you how often to have this done based on your age, family history, or other things that can increase your risk for heart disease. · Blood pressure. Have your blood pressure checked during a routine doctor visit. Your doctor will tell you how often to check your blood pressure based on your age, your blood pressure results, and other factors. · Vision. Talk with your doctor about how often to have a glaucoma test. 
· Diabetes. Ask your doctor whether you should have tests for diabetes. · Colon cancer. Your risk for colorectal cancer gets higher as you get older. Some experts say that adults should start regular screening at age 48 and stop at age 76. Others say to start before age 48 or continue after age 76. Talk with your doctor about your risk and when to start and stop screening. For women · Breast exam and mammogram. Talk to your doctor about when you should have a clinical breast exam and a mammogram. Medical experts differ on whether and how often women under 50 should have these tests. Your doctor can help you decide what is right for you. · Cervical cancer screening test and pelvic exam. Begin with a Pap test at age 24. The test often is part of a pelvic exam. Starting at age 27, you may choose to have a Pap test, an HPV test, or both tests at the same time (called co-testing). Talk with your doctor about how often to have testing. · Tests for sexually transmitted infections (STIs). Ask whether you should have tests for STIs. You may be at risk if you have sex with more than one person, especially if your partners do not wear condoms. For men · Tests for sexually transmitted infections (STIs). Ask whether you should have tests for STIs. You may be at risk if you have sex with more than one person, especially if you do not wear a condom. · Testicular cancer exam. Ask your doctor whether you should check your testicles regularly. · Prostate exam. Talk to your doctor about whether you should have a blood test (called a PSA test) for prostate cancer. Experts differ on whether and when men should have this test. Some experts suggest it if you are older than 39 and are -American or have a father or brother who got prostate cancer when he was younger than 72. When should you call for help? Watch closely for changes in your health, and be sure to contact your doctor if you have any problems or symptoms that concern you. Where can you learn more? Go to http://www.honeycutt.com/ Enter P072 in the search box to learn more about \"Well Visit, Ages 25 to 48: Care Instructions. \" Current as of: May 27, 2020               Content Version: 12.6 © 2006-2020 [employer]. Care instructions adapted under license by [employer] (which disclaims liability or warranty for this information). If you have questions about a medical condition or this instruction, always ask your healthcare professional. [address] any warranty or liability for your use of this information.

## 2020-12-31 ENCOUNTER — OFFICE VISIT (OUTPATIENT)
Dept: OBGYN CLINIC | Age: 45
End: 2020-12-31
Payer: COMMERCIAL

## 2020-12-31 VITALS
DIASTOLIC BLOOD PRESSURE: 90 MMHG | WEIGHT: 176 LBS | SYSTOLIC BLOOD PRESSURE: 139 MMHG | HEIGHT: 64 IN | BODY MASS INDEX: 30.05 KG/M2

## 2020-12-31 DIAGNOSIS — Z76.89 ENCOUNTER FOR MENSTRUAL REGULATION: ICD-10-CM

## 2020-12-31 DIAGNOSIS — Z01.419 ENCOUNTER FOR GYNECOLOGICAL EXAMINATION (GENERAL) (ROUTINE) WITHOUT ABNORMAL FINDINGS: Primary | ICD-10-CM

## 2020-12-31 PROCEDURE — 99396 PREV VISIT EST AGE 40-64: CPT | Performed by: OBSTETRICS & GYNECOLOGY

## 2020-12-31 RX ORDER — DROSPIRENONE AND ETHINYL ESTRADIOL 0.02-3(28)
1 KIT ORAL DAILY
Qty: 3 PACKAGE | Refills: 4 | Status: SHIPPED | OUTPATIENT
Start: 2020-12-31 | End: 2022-01-24 | Stop reason: SDUPTHER

## 2020-12-31 RX ORDER — METFORMIN HYDROCHLORIDE 500 MG/1
2000 TABLET, EXTENDED RELEASE ORAL
Qty: 360 TAB | Refills: 4 | Status: SHIPPED | OUTPATIENT
Start: 2020-12-31 | End: 2022-01-06

## 2020-12-31 NOTE — PROGRESS NOTES
[address] [facility]  [email or web address]  [phone]    Avery Zelaya MD, [facility]       Annual Gynecologic Exam  WWE 40-60  Chief Complaint   Patient presents with    Well Woman       Fatuma Estrada is a 41 y.o.  [facility] [place]  female who presents for an annual exam.  Patient's last menstrual period was 2020 (within days). .    She does not report additional concerns today. Still having trouble with losing weight. Menstrual status:  Her periods are light. She does not report dysmenorrhea/painful menses. She does not report irregular bleeding. Sexual history and Contraception:  Social History     Substance and Sexual Activity   Sexual Activity Yes    Partners: Male    Birth control/protection: Pill       She does not reports new sexual partner(s) in the last year. The patient does not request STD testing. Preventive Medicine History:  Her most recent Pap smear result: normal was obtained in 2018    Her most recent HR HPV screen was Negative obtained in     She does not have a history of VICKY 2, 3 or cervical cancer. Breast health:  Last mammogram: approximate date 19 and was normal.   A mammogram was scheduled for today. Breast cancer family updated: see . Bone health: Fernando Mccrary She does not have a history of osteopenia/osteoporosis. Osteoporosis family history updated: see .      Past Medical History:   Diagnosis Date    Encounter for screening mammogram for breast cancer 2016; 18; 19    Normal; Normal; Normal    Headache     History of mammogram 2019    negative birads 2 dense    HX OTHER MEDICAL     infertility,unscpeified    Migraine headache     without aura    Pap smear for cervical cancer screening 3/11/10; 9/2/15; 18    neg,hpv neg; Negative, HPV negative; Neg/HPV Neg    PCOS (polycystic ovarian syndrome)      OB History    Para Term  AB Living   2 1 1   1 1   SAB TAB Ectopic Molar Multiple Live Births   1         1      # Outcome Date GA Lbr Kaiden/2nd Weight Sex Delivery Anes PTL Lv   2 SAB            1 Term         FARHAN       Past Surgical History:   Procedure Laterality Date    HX OTHER SURGICAL  5/29/12    benign     Family History   Problem Relation Age of Onset    Elevated Lipids Mother     No Known Problems Father     Headache Brother      Social History     Socioeconomic History    Marital status:      Spouse name: Not on file    Number of children: Not on file    Years of education: Not on file    Highest education level: Not on file   Occupational History    Not on file   Social Needs    Financial resource strain: Not on file    Food insecurity     Worry: Not on file     Inability: Not on file    Transportation needs     Medical: Not on file     Non-medical: Not on file   Tobacco Use    Smoking status: Never Smoker    Smokeless tobacco: Never Used   Substance and Sexual Activity    Alcohol use: Yes     Alcohol/week: 0.0 standard drinks     Comment: Beer/wine-socially.     Drug use: No    Sexual activity: Yes     Partners: Male     Birth control/protection: Pill   Lifestyle    Physical activity     Days per week: Not on file     Minutes per session: Not on file    Stress: Not on file   Relationships    Social connections     Talks on phone: Not on file     Gets together: Not on file     Attends Religion service: Not on file     Active member of club or organization: Not on file     Attends meetings of clubs or organizations: Not on file     Relationship status: Not on file    Intimate partner violence     Fear of current or ex partner: Not on file     Emotionally abused: Not on file     Physically abused: Not on file     Forced sexual activity: Not on file   Other Topics Concern    Not on file   Social History Narrative    Not on file       No Known Allergies    Current Outpatient Medications   Medication Sig    drospirenone-ethinyl estradioL (Hali, 28,) 3-0.02 mg tab Take 1 Tab by mouth daily.  metFORMIN ER (GLUCOPHAGE XR) 500 mg tablet Take 4 Tabs by mouth daily (with dinner).  erenumab-aooe (Aimovig Autoinjector) 70 mg/mL injection 1 mL by SubCUTAneous route every thirty (30) days.  SUMAtriptan succinate 6 mg/0.5 mL kit     ZEMBRACE SYMTOUCH 3 mg/0.5 mL pnij 1 at HA onset and repeat in 1 hour x 1 prn up to 4 in 24 hours     No current facility-administered medications for this visit.         Patient Active Problem List   Diagnosis Code    Hirsutism L68.0    PCOS (polycystic ovarian syndrome) E28.2    Irregular menses N92.6    Migraine without aura, intractable, with status migrainosus G43.011       Review of Systems - History obtained from the patient  Constitutional/general, HEENT, CV, Resp, GI, MSK, Neuro, Psych, Heme/lymph, Skin, Breast ROS: no significant complaints except as noted on HPI       Physical Exam  Visit Vitals  BP (!) 139/90 (BP 1 Location: Right arm, BP Patient Position: Sitting)   Ht 5' 4\" (1.626 m)   Wt 176 lb (79.8 kg)   LMP 12/26/2020 (Within Days)   BMI 30.21 kg/m²       Constitutional  · Appearance: well-nourished, well developed, alert, in no acute distress    HENT  · Head and Face: appears normal    Neck  · Inspection/Palpation: normal appearance, no masses or tenderness  · Lymph Nodes: no lymphadenopathy present  · Thyroid: gland size normal, nontender, no nodules or masses present on palpation    Chest  · Respiratory Effort: breathing unlabored  · Auscultation: normal breath sounds    Cardiovascular  · Heart:  · Auscultation: regular rate and rhythm without murmur    Breasts  · Inspection of Breasts: breasts symmetrical, no skin changes, no discharge present, nipple appearance normal, no skin retraction present  · Palpation of Breasts and Axillae: no masses present on palpation, no breast tenderness  · Axillary Lymph Nodes: no lymphadenopathy present    Gastrointestinal  · Abdominal Examination: abdomen non-tender to palpation, normal bowel sounds, no masses present  · Liver and spleen: no hepatomegaly present, spleen not palpable  · Hernias: no hernias identified    Genitourinary  · External Genitalia: normal appearance for age, no discharge present, no tenderness present, no inflammatory lesions present, no masses present, without atrophy present  · Vagina: normal vaginal vault without central or paravaginal defects, no discharge present, no inflammatory lesions present, no masses present  · Bladder: non-tender to palpation  · Urethra: appears normal  · Cervix: normal   · Uterus: normal size, shape and consistency  · Adnexa: no adnexal tenderness present, no adnexal masses present  · Perineum: perineum within normal limits, no evidence of trauma, no rashes or skin lesions present  · Anus: anus within normal limits, no hemorrhoids present  · Inguinal Lymph Nodes: no lymphadenopathy present    Skin  · General Inspection: no rash, no lesions identified    Neurologic/Psychiatric  · Mental Status:  · Orientation: grossly oriented to person, place and time  · Mood and Affect: mood normal, affect appropriate    Assessment:  41 y.o.  for well woman exam  Encounter Diagnoses   Name Primary?  Encounter for gynecological examination (general) (routine) without abnormal findings Yes    Encounter for menstrual regulation        Plan:  The patient was counseled about healthy lifestyle, disease prevention, and bone protection. We discussed current self breast exam and mammogram recommendations  We discussed current pap smear and HR HPV testing guidelines  We recommend follow up in one year for routine annual gynecologic exam or sooner if needed  Handouts were given to the patient  We recommend follow up with a primary care physician for chronic medical problems and evaluation of non-gynecologic concerns and to please contact our office with any GYN questions or concerns.   Rec endo/Sicat consult: info given  Discussed risks, benefits and alternatives of OCP/nuvaring/patch: including but not limited to dvt/pe/mi/cva/ca/gi risks. She is encouraged to read package insert and to follow up with me or her pharmacist with any questions or concerns. Disc potential increase risks with buzz/everardo and interaction with other medications and potassium levels. Metformin, refilled     Folllow up:  [x] return for annual well woman exam in one year or sooner if she is having problems  [] follow up and ultrasound  [x] mammogram  [] 6 months  [] 3 months  [] 1 month    Orders Placed This Encounter    drospirenone-ethinyl estradioL (Hali, 28,) 3-0.02 mg tab    metFORMIN ER (GLUCOPHAGE XR) 500 mg tablet       Results for orders placed or performed during the hospital encounter of 12/31/20   [facility] MAMMO BI SCREENING INCL CAD    Narrative    STUDY: Bilateral digital screening mammogram    INDICATION:  Screening. COMPARISON: [id number]    BREAST COMPOSITION:  There are scattered areas of fibroglandular density. FINDINGS: Bilateral digital screening mammography was performed and is  interpreted in conjunction with a computer assisted detection (CAD) system. No  suspicious masses or calcifications are identified. There has been no  significant change. Impression    IMPRESSION:  BI-RADS 1: Negative. No mammographic evidence of malignancy. RECOMMENDATIONS:  Next screening mammogram is recommended in one year. The patient will be notified of these results.

## 2021-03-17 DIAGNOSIS — G43.011 MIGRAINE WITHOUT AURA, INTRACTABLE, WITH STATUS MIGRAINOSUS: ICD-10-CM

## 2021-03-17 RX ORDER — ERENUMAB-AOOE 70 MG/ML
INJECTION SUBCUTANEOUS
Qty: 1 ML | Refills: 5 | Status: SHIPPED | OUTPATIENT
Start: 2021-03-17 | End: 2021-12-02 | Stop reason: SDUPTHER

## 2021-10-19 ENCOUNTER — PATIENT MESSAGE (OUTPATIENT)
Dept: NEUROLOGY | Age: 46
End: 2021-10-19

## 2021-10-19 NOTE — TELEPHONE ENCOUNTER
----- Message from Meri Salgado LPN sent at [phone]  4:08 PM EDT -----  Regarding: FW: Prescription Question  Contact: [phone]    ----- Message -----  From: Ilia Westbrook  Sent: 10/19/2021   3:13 PM EDT  To: [facility] Nurse [place]  Subject: Prescription Question                            My insurance has again denied [employer] saying I need prior authorization from doctor. Would you please call [phone] to give them needed info; # of migraine days per month, level of severity and failed treatments from, at least two different drug classes.

## 2021-11-10 ENCOUNTER — TELEPHONE (OUTPATIENT)
Dept: NEUROLOGY | Age: 46
End: 2021-11-10

## 2021-12-02 DIAGNOSIS — G43.011 MIGRAINE WITHOUT AURA, INTRACTABLE, WITH STATUS MIGRAINOSUS: ICD-10-CM

## 2021-12-02 RX ORDER — ERENUMAB-AOOE 70 MG/ML
70 INJECTION SUBCUTANEOUS
Qty: 1 ML | Refills: 5 | Status: SHIPPED | OUTPATIENT
Start: 2021-12-02 | End: 2022-01-13 | Stop reason: SDUPTHER

## 2022-01-06 ENCOUNTER — TELEPHONE (OUTPATIENT)
Dept: OBGYN CLINIC | Age: 47
End: 2022-01-06

## 2022-01-06 RX ORDER — METFORMIN HYDROCHLORIDE 500 MG/1
TABLET, EXTENDED RELEASE ORAL
Qty: 120 TABLET | Refills: 0 | Status: SHIPPED | OUTPATIENT
Start: 2022-01-06 | End: 2022-02-09

## 2022-01-06 NOTE — TELEPHONE ENCOUNTER
40year old patient last seen in the office on 12/21/2020 and has next appointment for ae and mammogram on 2/18/2022      ? ok to refill has pended   Please amend/sign    Thank you

## 2022-01-13 ENCOUNTER — OFFICE VISIT (OUTPATIENT)
Dept: NEUROLOGY | Age: 47
End: 2022-01-13
Payer: COMMERCIAL

## 2022-01-13 VITALS
OXYGEN SATURATION: 100 % | SYSTOLIC BLOOD PRESSURE: 111 MMHG | BODY MASS INDEX: 25.95 KG/M2 | TEMPERATURE: 97.8 F | WEIGHT: 152 LBS | DIASTOLIC BLOOD PRESSURE: 77 MMHG | HEART RATE: 82 BPM | HEIGHT: 64 IN

## 2022-01-13 DIAGNOSIS — G43.011 MIGRAINE WITHOUT AURA, INTRACTABLE, WITH STATUS MIGRAINOSUS: ICD-10-CM

## 2022-01-13 PROCEDURE — 99213 OFFICE O/P EST LOW 20 MIN: CPT | Performed by: PSYCHIATRY & NEUROLOGY

## 2022-01-13 RX ORDER — ERENUMAB-AOOE 70 MG/ML
70 INJECTION SUBCUTANEOUS
Qty: 1 ML | Refills: 11 | Status: SHIPPED | OUTPATIENT
Start: 2022-01-13

## 2022-01-13 NOTE — PROGRESS NOTES
[facility] and [address] at [facility] at [address] [facility], [address] [facility] [address] [facility], [address] [place] [address]   [phone]              No chief complaint on file. Current Outpatient Medications   Medication Sig Dispense Refill    erenumab-aooe (Aimovig Autoinjector) 70 mg/mL injection 1 mL by SubCUTAneous route every thirty (30) days. 1 mL 11    metFORMIN ER (GLUCOPHAGE XR) 500 mg tablet TAKE 4 TABLETS BY MOUTH DAILY (WITH DINNER). 120 Tablet 0    drospirenone-ethinyl estradioL (Hali, 28,) 3-0.02 mg tab Take 1 Tab by mouth daily. 3 Package 4    SUMAtriptan succinate 6 mg/0.5 mL kit         No Known Allergies  Social History     Tobacco Use    Smoking status: Never Smoker    Smokeless tobacco: Never Used   Substance Use Topics    Alcohol use: Yes     Alcohol/week: 0.0 standard drinks     Comment: Beer/wine-socially.  Drug use: No     78-year-old lady returns today for follow-up migraine headaches. She was last seen November 2019 and was doing well on Aimovig as well as Zembrace. Chart review finds [facility] authorized through October 2022. Today she reports he is doing well. She has infrequent headache not debilitating. Uses some Motrin if she needs. Tolerates Aimovig. She has not been using the [facility] as she has not had any and not needed it. Overall happy with how she is doing    Examination  There were no vitals taken for this visit. awake, alert and oriented. Normal speech and language. Full versions. No nystagmus. No pronation or drift. No ataxia    Impression/Plan  Intractable migraine headaches doing well  Continue Aimovig  Follow-up 1 year    Danielle Nevarez MD        This note was created using voice recognition software. Despite editing, there may be syntax errors.

## 2022-01-24 RX ORDER — DROSPIRENONE AND ETHINYL ESTRADIOL 0.02-3(28)
1 KIT ORAL DAILY
Qty: 28 DOSE PACK | Refills: 0 | Status: SHIPPED | OUTPATIENT
Start: 2022-01-24 | End: 2022-01-27

## 2022-01-24 NOTE — TELEPHONE ENCOUNTER
40year old patient last seen in the office on 12/31/2020 for ae and has next appointment on 2/18/2022 for ae and mammmogram    Prescription refill sent as per MD order to get patient to her scheduled appointment       Patient was sent a my chart message

## 2022-01-27 ENCOUNTER — TELEPHONE (OUTPATIENT)
Dept: OBGYN CLINIC | Age: 47
End: 2022-01-27

## 2022-01-27 RX ORDER — DROSPIRENONE AND ETHINYL ESTRADIOL 0.02-3(28)
1 KIT ORAL DAILY
Qty: 28 DOSE PACK | Refills: 0 | Status: SHIPPED | OUTPATIENT
Start: 2022-01-27 | End: 2022-02-18 | Stop reason: SDUPTHER

## 2022-01-27 NOTE — TELEPHONE ENCOUNTER
Call received at 820am    40year old patient last seen in the office on 12/31/2020 and has upcoming appointment on 2/18/2022 for mammogram and ae    [facility] calling to say that the patient can see that she has a new prescription but the pharmacy does not see the script due to submission failure    Prescription has been resent as per MD order to the pharmacy

## 2022-02-09 ENCOUNTER — TELEPHONE (OUTPATIENT)
Dept: OBGYN CLINIC | Age: 47
End: 2022-02-09

## 2022-02-09 RX ORDER — METFORMIN HYDROCHLORIDE 500 MG/1
TABLET, EXTENDED RELEASE ORAL
Qty: 120 TABLET | Refills: 0 | Status: SHIPPED | OUTPATIENT
Start: 2022-02-09

## 2022-02-09 NOTE — TELEPHONE ENCOUNTER
40year old patient last seen in the office on 12/31/2020 and has upcoming appointment on 2/18/2022 for ae and mammogram    ? [facility] refill has pended       Please amend/sign    Thank you

## 2022-02-15 ENCOUNTER — PATIENT MESSAGE (OUTPATIENT)
Dept: OBGYN CLINIC | Age: 47
End: 2022-02-15

## 2022-02-18 ENCOUNTER — OFFICE VISIT (OUTPATIENT)
Dept: OBGYN CLINIC | Age: 47
End: 2022-02-18
Payer: COMMERCIAL

## 2022-02-18 VITALS
HEIGHT: 64 IN | HEART RATE: 103 BPM | DIASTOLIC BLOOD PRESSURE: 77 MMHG | SYSTOLIC BLOOD PRESSURE: 117 MMHG | BODY MASS INDEX: 26.43 KG/M2 | WEIGHT: 154.8 LBS

## 2022-02-18 DIAGNOSIS — Z12.4 ENCOUNTER FOR PAPANICOLAOU SMEAR FOR CERVICAL CANCER SCREENING: Primary | ICD-10-CM

## 2022-02-18 DIAGNOSIS — Z76.89 ENCOUNTER FOR MENSTRUAL REGULATION: ICD-10-CM

## 2022-02-18 DIAGNOSIS — Z01.419 ENCOUNTER FOR WELL WOMAN EXAM WITH ROUTINE GYNECOLOGICAL EXAM: ICD-10-CM

## 2022-02-18 PROCEDURE — 99396 PREV VISIT EST AGE 40-64: CPT | Performed by: OBSTETRICS & GYNECOLOGY

## 2022-02-18 RX ORDER — DROSPIRENONE AND ETHINYL ESTRADIOL 0.02-3(28)
1 KIT ORAL DAILY
Qty: 3 DOSE PACK | Refills: 4 | Status: SHIPPED | OUTPATIENT
Start: 2022-02-18

## 2022-02-18 NOTE — PATIENT INSTRUCTIONS
Well Visit, Ages 25 to 48: Care Instructions  Overview     Well visits can help you stay healthy. Your doctor has checked your overall health and may have suggested ways to take good care of yourself. Your doctor also may have recommended tests. At home, you can help prevent illness with healthy eating, regular exercise, and other steps. Follow-up care is a key part of your treatment and safety. Be sure to make and go to all appointments, and call your doctor if you are having problems. It's also a good idea to know your test results and keep a list of the medicines you take. How can you care for yourself at home? · Get screening tests that you and your doctor decide on. Screening helps find diseases before any symptoms appear. · Eat healthy foods. Choose fruits, vegetables, whole grains, protein, and low-fat dairy foods. Limit fat, especially saturated fat. Reduce salt in your diet. · Limit alcohol. If you are a man, have no more than 2 drinks a day or 14 drinks a week. If you are a woman, have no more than 1 drink a day or 7 drinks a week. · Get at least 30 minutes of physical activity on most days of the week. Walking is a good choice. You also may want to do other activities, such as running, swimming, cycling, or playing tennis or team sports. Discuss any changes in your exercise program with your doctor. · Reach and stay at a healthy weight. This will lower your risk for many problems, such as obesity, diabetes, heart disease, and high blood pressure. · Do not smoke or allow others to smoke around you. If you need help quitting, talk to your doctor about stop-smoking programs and medicines. These can increase your chances of quitting for good. · Care for your mental health. It is easy to get weighed down by worry and stress. Learn strategies to manage stress, like deep breathing and mindfulness, and stay connected with your family and community.  If you find you often feel sad or hopeless, talk with your doctor. Treatment can help. · Talk to your doctor about whether you have any risk factors for sexually transmitted infections (STIs). You can help prevent STIs if you wait to have sex with a new partner (or partners) until you've each been tested for STIs. It also helps if you use condoms (male or female condoms) and if you limit your sex partners to one person who only has sex with you. Vaccines are available for some STIs, such as HPV. · Use birth control if it's important to you to prevent pregnancy. Talk with your doctor about the choices available and what might be best for you. · If you think you may have a problem with alcohol or drug use, talk to your doctor. This includes prescription medicines (such as amphetamines and opioids) and illegal drugs (such as cocaine and methamphetamine). Your doctor can help you figure out what type of treatment is best for you. · Protect your skin from too much sun. When you're outdoors from 10 a.m. to 4 p.m., stay in the shade or cover up with clothing and a hat with a wide brim. Wear sunglasses that block UV rays. Even when it's cloudy, put broad-spectrum sunscreen (SPF 30 or higher) on any exposed skin. · See a dentist one or two times a year for checkups and to have your teeth cleaned. · Wear a seat belt in the car. When should you call for help? Watch closely for changes in your health, and be sure to contact your doctor if you have any problems or symptoms that concern you. Where can you learn more? Go to http://www.ShareThis.com/  Enter P072 in the search box to learn more about \"Well Visit, Ages 25 to 48: Care Instructions. \"  Current as of: February 11, 2021               Content Version: 13.0  © 3074-9841 [employer]. Care instructions adapted under license by [employer] (which disclaims liability or warranty for this information).  If you have questions about a medical condition or this instruction, always ask your healthcare professional. [place] [address] any warranty or liability for your use of this information.

## 2022-02-18 NOTE — PROGRESS NOTES
[facility]  [email or web address]  [phone]    Artur Rios MD, [address] [facility]       Annual Gynecologic Exam  WWE 40-60  Chief Complaint   Patient presents with    Well Woman       Taylor Host is a 40 y.o.  [address] [facility]  female who presents for an annual exam.  Patient's last menstrual period was 2022. [place] Patient has the following concerns today: doing well. OCP refill. Not currently SA. Pt has dry patches of skin on her legs, pt used an ointment for that today, denies vaginal dryness or skin changes. Menstrual status:  She does not report dysmenorrhea/painful menses. She does not report heavy menses. She does not report irregular bleeding. Sexual history and Contraception:  Social History     Substance and Sexual Activity   Sexual Activity Not Currently    Partners: Male    Birth control/protection: Pill       She does not reports new sexual partner(s) in the last year. Preventive Medicine History:  Her most recent Pap smear result: normal was obtained in 2018    Her most recent HR HPV screen was Negative obtained in 2018    She does not have a history of VICKY 2, 3 or cervical cancer. Breast health:  [facility] Results (most recent):  Results from [place] encounter on 20    [facility] MAMMO BI SCREENING INCL CAD    Narrative  STUDY: Bilateral digital screening mammogram    INDICATION:  Screening. COMPARISON: [id number]    BREAST COMPOSITION:  There are scattered areas of fibroglandular density. FINDINGS: Bilateral digital screening mammography was performed and is  interpreted in conjunction with a computer assisted detection (CAD) system. No  suspicious masses or calcifications are identified. There has been no  significant change. Impression  IMPRESSION:  BI-RADS 1: Negative. No mammographic evidence of malignancy. RECOMMENDATIONS:  Next screening mammogram is recommended in one year.     The patient will be notified of these results. Last mammogram: approximate date 2020 and was normal.   Breast cancer family updated: see FH. Past Medical History:   Diagnosis Date    Encounter for screening mammogram for breast cancer 2016; 17; 18    Normal; Normal; Normal    Headache     History of mammogram 2019; 20    negative birads 2 dense; BI-RADS 1: Negative.  HX OTHER MEDICAL     infertility,unscpeified    Migraine headache     without aura    Pap smear for cervical cancer screening 3/11/10; 9/2/15; 18    neg,hpv neg; Negative, HPV negative; Neg/HPV Neg    PCOS (polycystic ovarian syndrome)      OB History    Para Term  AB Living   2 1 1   1 1   SAB IAB Ectopic Molar Multiple Live Births   1         1      # Outcome Date GA Lbr [place]/2nd Weight Sex Delivery Anes PTL Lv   2 SAB            1 Term         FARHAN       Past Surgical History:   Procedure Laterality Date    HX OTHER SURGICAL  12    benign     Family History   Problem Relation Age of Onset    Elevated Lipids Mother     No Known Problems Father     Headache Brother      Social History     Socioeconomic History    Marital status:      Spouse name: Not on file    Number of children: Not on file    Years of education: Not on file    Highest education level: Not on file   Occupational History    Not on file   Tobacco Use    Smoking status: Never Smoker    Smokeless tobacco: Never Used   Vaping Use    Vaping Use: Never used   Substance and Sexual Activity    Alcohol use: Yes     Alcohol/week: 0.0 standard drinks     Comment: Beer/wine-socially.     Drug use: No    Sexual activity: Not Currently     Partners: Male     Birth control/protection: Pill   Other Topics Concern    Not on file   Social History Narrative    Not on file     Social Determinants of Health     Financial Resource Strain:     Difficulty of Paying Living Expenses: Not on file   Food Insecurity:     Worried About Running Out of Food in the Last Year: Not on file    Ran Out of Food in the Last Year: Not on file   Transportation Needs:     Lack of Transportation (Medical): Not on file    Lack of Transportation (Non-Medical): Not on file   Physical Activity:     Days of Exercise per Week: Not on file    Minutes of Exercise per Session: Not on file   Stress:     Feeling of Stress : Not on file   Social Connections:     Frequency of Communication with Friends and Family: Not on file    Frequency of Social Gatherings with Friends and Family: Not on file    Attends Congregation Services: Not on file    Active Member of [address] [place] [address] [employer] or Organizations: Not on file    Attends Club or Organization Meetings: Not on file    Marital Status: Not on file   Intimate Partner Violence:     Fear of Current or Ex-Partner: Not on file    Emotionally Abused: Not on file    Physically Abused: Not on file    Sexually Abused: Not on file   Housing Stability:     Unable to Pay for Housing in the Last Year: Not on file    Number of [place] in the Last Year: Not on file    Unstable Housing in the Last Year: Not on file       No Known Allergies    Current Outpatient Medications   Medication Sig    drospirenone-ethinyl estradioL (Hali, 28,) 3-0.02 mg tab Take 1 Tablet by mouth daily.  metFORMIN ER (GLUCOPHAGE XR) 500 mg tablet TAKE 4 TABLETS BY MOUTH DAILY (WITH DINNER).  erenumab-aooe (Aimovig Autoinjector) 70 mg/mL injection 1 mL by SubCUTAneous route every thirty (30) days.  SUMAtriptan succinate 6 mg/0.5 mL kit  (Patient not taking: Reported on 1/13/2022)     No current facility-administered medications for this visit.        Patient Active Problem List   Diagnosis Code    Hirsutism L68.0    PCOS (polycystic ovarian syndrome) E28.2    Irregular menses N92.6    Migraine without aura, intractable, with status migrainosus G43.011       Review of Systems - History obtained from the patient  Constitutional/general, HEENT, CV, Resp, GI, MSK, Neuro, Psych, Heme/lymph, Skin, Breast ROS: no significant complaints except as noted on HPI     Physical Exam  Visit Vitals  /77   Pulse (!) 103   Ht 5' 4\" (1.626 m)   Wt 154 lb 12.8 oz (70.2 kg)   LMP 02/05/2022   BMI 26.57 kg/m²       Constitutional  · Appearance: well-nourished, well developed, alert, in no acute distress    HENT  · Head and Face: appears normal    Neck  · Inspection/Palpation: normal appearance, no masses or tenderness  · Lymph Nodes: no lymphadenopathy present  · Thyroid: gland size normal, nontender, no nodules or masses present on palpation    Chest  · Respiratory Effort: breathing unlabored  · Auscultation: normal breath sounds    Cardiovascular  · Heart:  · Auscultation: regular rate and rhythm without murmur    Breasts  · Inspection of Breasts: breasts symmetrical, no skin changes, no discharge present, nipple appearance normal, no skin retraction present  · Palpation of Breasts and Axillae: no masses present on palpation, no breast tenderness  · Axillary Lymph Nodes: no lymphadenopathy present    Gastrointestinal  · Abdominal Examination: abdomen non-tender to palpation, normal bowel sounds, no masses present  · Liver and spleen: no hepatomegaly present, spleen not palpable  · Hernias: no hernias identified    Genitourinary  · External Genitalia: normal appearance for age, no discharge present, no tenderness present, no inflammatory lesions present, no masses present, without atrophy present  · Vagina: normal vaginal vault without central or paravaginal defects, no discharge present, no inflammatory lesions present, no masses present  · Bladder: non-tender to palpation  · Urethra: appears normal  · Cervix: normal   · Uterus: normal size, shape and consistency  · Adnexa: no adnexal tenderness present, no adnexal masses present  · Perineum: perineum within normal limits, no evidence of trauma, no rashes or skin lesions present  · Anus: anus within normal limits, no hemorrhoids present  · Inguinal Lymph Nodes: no lymphadenopathy present    Skin  · General Inspection: no rash, no lesions identified    Neurologic/Psychiatric  · Mental Status:  · Orientation: grossly oriented to person, place and time  · Mood and Affect: mood normal, affect appropriate    Assessment:  40 y.o.  for well woman exam  Encounter Diagnoses   Name Primary?  Encounter for Papanicolaou smear for cervical cancer screening Yes    Encounter for well woman exam with routine gynecological exam     Encounter for menstrual regulation        Plan:  The patient was counseled about healthy lifestyle, disease prevention, and bone protection. We discussed current self breast exam and mammogram recommendations  We discussed current pap smear and HR HPV testing guidelines  I recommended follow up in one year for routine annual gynecologic exam or sooner if needed  I recommended follow up with a primary care physician for chronic medical problems and evaluation of non-gynecologic concerns and to please contact our office with any GYN questions or concerns. Discussed risks, benefits and alternatives of OCP/nuvaring/patch: including but not limited to dvt/pe/mi/cva/ca/gi risks and that smoking, increasing age and other health conditions can increase these risks. We discussed typical perimenopausal bleeding patterns and symptoms. I recommend that she notify MD for chaotic or symptomatic bleeding, or bleeding in between menses or intermenstrual bleeding for additional evaluation. She can also schedule a consult to discuss management of symptoms of perimenopause that are concerning her or interfering with her life or day to day function or activity.    Plan to continue OCP for now  Calcium h/o given   Pt reports does not need metformin refill at this time       Folllow up:  [x] return for annual well woman exam in one year or sooner if she is having problems  [] follow up and ultrasound  [x] mammogram  [] 6 months  [] 3 months  [] 1 month    Orders Placed This Encounter    drospirenone-ethinyl estradioL (Hali, 28,) 3-0.02 mg tab    PAP IG, APTIMA HPV AND RFX 16/18,45 ([id number])       No results found for any visits on 02/18/22.

## 2022-02-23 LAB
CYTOLOGIST CVX/VAG CYTO: NORMAL
CYTOLOGY CVX/VAG DOC CYTO: NORMAL
CYTOLOGY CVX/VAG DOC THIN PREP: NORMAL
CYTOLOGY HISTORY:: NORMAL
DX ICD CODE: NORMAL
HPV I/H RISK 4 DNA CVX QL PROBE+SIG AMP: NEGATIVE
Lab: NORMAL
OTHER STN SPEC: NORMAL
STAT OF ADQ CVX/VAG CYTO-IMP: NORMAL

## 2022-02-24 NOTE — PROGRESS NOTES
Normal pap smear, message sent if [address] active. Update PMH/HM: include: Date of pap, Cytology: wnl. For HR HPV results: list NEG or POS, when done.

## 2022-03-19 PROBLEM — G43.011 MIGRAINE WITHOUT AURA, INTRACTABLE, WITH STATUS MIGRAINOSUS: Status: ACTIVE | Noted: 2018-01-13

## 2022-03-20 PROBLEM — N92.6 IRREGULAR MENSES: Status: ACTIVE | Noted: 2017-09-29

## 2022-04-11 DIAGNOSIS — R92.8 ABNORMAL MAMMOGRAM OF LEFT BREAST: Primary | ICD-10-CM

## 2022-04-25 ENCOUNTER — APPOINTMENT (OUTPATIENT)
Dept: MAMMOGRAPHY | Age: 47
End: 2022-04-25
Attending: OBSTETRICS & GYNECOLOGY
Payer: COMMERCIAL

## 2022-04-25 ENCOUNTER — HOSPITAL ENCOUNTER (OUTPATIENT)
Dept: MAMMOGRAPHY | Age: 47
Discharge: HOME | End: 2022-04-25
Attending: OBSTETRICS & GYNECOLOGY
Payer: COMMERCIAL

## 2022-04-25 ENCOUNTER — HOSPITAL ENCOUNTER (OUTPATIENT)
Dept: MAMMOGRAPHY | Age: 47
End: 2022-04-25
Attending: OBSTETRICS & GYNECOLOGY
Payer: COMMERCIAL

## 2022-04-25 ENCOUNTER — TRANSCRIBE ORDER (OUTPATIENT)
Dept: MAMMOGRAPHY | Age: 47
End: 2022-04-25

## 2022-04-25 DIAGNOSIS — R92.8 ABNORMALITY OF LEFT BREAST ON SCREENING MAMMOGRAM: ICD-10-CM

## 2022-04-25 DIAGNOSIS — R92.8 ABNORMALITY OF LEFT BREAST ON SCREENING MAMMOGRAM: Primary | ICD-10-CM

## 2022-04-25 DIAGNOSIS — R92.8 ABNORMAL MAMMOGRAM OF LEFT BREAST: ICD-10-CM

## 2022-04-25 PROCEDURE — 77061 BREAST TOMOSYNTHESIS UNI: CPT

## 2022-11-29 ENCOUNTER — TELEPHONE (OUTPATIENT)
Dept: OBGYN CLINIC | Age: 47
End: 2022-11-29

## 2022-11-30 RX ORDER — METFORMIN HYDROCHLORIDE 500 MG/1
TABLET, EXTENDED RELEASE ORAL
Qty: 120 TABLET | Refills: 1 | Status: SHIPPED | OUTPATIENT
Start: 2022-11-30

## 2022-11-30 NOTE — TELEPHONE ENCOUNTER
56year old patient last seen in the office on 2/18/2022 for ae           Plan:  The patient was counseled about healthy lifestyle, disease prevention, and bone protection. We discussed current self breast exam and mammogram recommendations  We discussed current pap smear and HR HPV testing guidelines  I recommended follow up in one year for routine annual gynecologic exam or sooner if needed  I recommended follow up with a primary care physician for chronic medical problems and evaluation of non-gynecologic concerns and to please contact our office with any GYN questions or concerns. Discussed risks, benefits and alternatives of OCP/nuvaring/patch: including but not limited to dvt/pe/mi/cva/ca/gi risks and that smoking, increasing age and other health conditions can increase these risks. We discussed typical perimenopausal bleeding patterns and symptoms. I recommend that she notify MD for chaotic or symptomatic bleeding, or bleeding in between menses or intermenstrual bleeding for additional evaluation. She can also schedule a consult to discuss management of symptoms of perimenopause that are concerning her or interfering with her life or day to day function or activity.    Plan to continue OCP for now  Calcium h/o given   Pt reports does not need metformin refill at this time      Please advised of pended refill request from the pharmacy last sent on 2/9/2022    Please amend/sign    Thank youu

## 2022-12-01 NOTE — TELEPHONE ENCOUNTER
Monica Nascimento MD  to Me    TP    7:45 PM  Ok to send refill but no recent dm screen   Rec 2 hr fasting glucose/dm screen, can do with [facility], pls notify pt and make sure she has an am visit. TRP     Patient advised of MD sent prescription and recommendations and well woman exam updated to include 2 hour fasting glucose    Patient verbalized understanding.

## 2023-01-17 ENCOUNTER — VIRTUAL VISIT (OUTPATIENT)
Dept: NEUROLOGY | Age: 48
End: 2023-01-17
Payer: COMMERCIAL

## 2023-01-17 DIAGNOSIS — G43.011 MIGRAINE WITHOUT AURA, INTRACTABLE, WITH STATUS MIGRAINOSUS: ICD-10-CM

## 2023-01-17 PROCEDURE — 99214 OFFICE O/P EST MOD 30 MIN: CPT | Performed by: NURSE PRACTITIONER

## 2023-01-17 RX ORDER — ELETRIPTAN HYDROBROMIDE 40 MG/1
TABLET, FILM COATED ORAL
Qty: 9 TABLET | Refills: 3 | Status: SHIPPED | OUTPATIENT
Start: 2023-01-17

## 2023-01-17 RX ORDER — ERENUMAB-AOOE 70 MG/ML
70 INJECTION SUBCUTANEOUS
Qty: 1 ML | Refills: 11 | Status: SHIPPED | OUTPATIENT
Start: 2023-01-17

## 2023-01-17 NOTE — PROGRESS NOTES
[address] [facility],[address]  [address]   [place] [address] [facility] [place] [phone] Office   [phone] Fax           Date:  23     Name:  Chelsy Rivera  :  1975  MRN:  [id number]     PCP:  Anusha Lynch MD    Mirza Barragan is a 56 y.o. female who was seen by synchronous (real-time) audio-video technology on 2023 for Migraine    Subjective:   Since being on Aimovig, she rarely has any migraines. For rescue, she is using Ibuprofen which works since the headaches she does have are not severe. However, she does not have any other rescue medications. Current Outpatient Medications   Medication Sig    metFORMIN ER (GLUCOPHAGE XR) 500 mg tablet TAKE 4 TABLETS BY MOUTH DAILY (WITH DINNER). drospirenone-ethinyl estradioL (Hali, Prem,) 3-0.02 mg tab Take 1 Tablet by mouth daily. erenumab-aooe (Aimovig Autoinjector) 70 mg/mL injection 1 mL by SubCUTAneous route every thirty (30) days. No current facility-administered medications for this visit. No Known Allergies   Past Medical History:   Diagnosis Date    Encounter for screening mammogram for breast cancer 2016; 17; 19    Normal; Normal; Normal    H/O diagnostic mammography 2022    left breast dx mammo WNL    Headache     History of mammogram 2019; 20    negative birads 2 dense; BI-RADS 1: Negative. HX OTHER MEDICAL     infertility,unscpeified    Migraine headache     without aura    Pap smear for cervical cancer screening 3/11/10; 9/2/15; 19;     neg,hpv neg; Negative, HPV negative; Neg/HPV Neg; neg/hpv neg    PCOS (polycystic ovarian syndrome)      Past Surgical History:   Procedure Laterality Date    HX OTHER SURGICAL  12    benign      reports that she has never smoked. She has never used smokeless tobacco. She reports current alcohol use.  She reports that she does not use drugs. family history includes Elevated Lipids in her mother; Headache in her brother; No Known Problems in her father. ROS    Objective:     Patient-Reported Vitals 1/16/2023   Patient-Reported Weight 135   Patient-Reported LMP 12/29/2022        General:  Well defined, nourished, and groomed individual in no acute distress. Psych:  Good mood and bright affect    NEUROLOGICAL EXAMINATION:     Mental Status:   Alert and oriented to person, place, and time with recent and remote memory intact. Attention span and concentration are normal. Speech is fluent with a full fund of knowledge. Cranial Nerves:  I: smell Not tested   II: visual fields Not assessed   II: pupils Equal, round, reactive to light   II: optic disc Not assessed   III,VII: ptosis none   III,IV,VI: extraocular muscles  Full ROM   V: mastication normal   V: facial light touch sensation  Not assessed   VII: facial muscle function   symmetric   VIII: hearing symmetric   IX: soft palate elevation  normal   XI: trapezius strength  Not assessed   XI: sternocleidomastoid strength Not assessed   XI: neck flexion strength  Not assessed   XII: tongue  midline     Motor Examination: Normal tone and bulk. Strength was not assessed      Sensory exam:  Not assessed     Coordination:  Heel-to-shin was smooth and symmetrical bilaterally. Finger to nose and rapid arm movement testing was normal.   No resting or intention tremor    Gait and Station:  Steady while walking on toes, heels, and with tandem walking. Normal arm swing. No pronator drift. No muscle wasting or fasiculations noted. Reflexes:  Not assessed    Assessment & Plan:       ICD-10-CM ICD-9-CM    1. Migraine without aura, intractable, with status migrainosus  G43.011 346.13 erenumab-aooe (Aimovig Autoinjector) 70 mg/mL injection      eletriptan (Relpax) 40 mg tablet        Will continue with Aimovig 70mg monthly.  Previous preventatives include topamax, Trokendi XR, and she is normotensive. Provided with Relpax which worked for her in the past for rescue. She has been on Imitrex injection and Treximet  Follow up yearly      We discussed the expected course, resolution and complications of the diagnosis(es) in detail. Medication risks, benefits, costs, interactions, and alternatives were discussed as indicated. I advised her to contact the office if her condition worsens, changes or fails to improve as anticipated. She expressed understanding with the diagnosis(es) and plan. Marni Laughlin, was evaluated through a synchronous (real-time) audio-video encounter. The patient (or guardian if applicable) is aware that this is a billable service, which includes applicable co-pays. This Virtual Visit was conducted with patient's (and/or legal guardian's) consent. The visit was conducted pursuant to the emergency declaration under the [address] [place] [address], [address] [place] [address] [facility] authority and the [employer] General Act. Patient identification was verified, and a caregiver was present when appropriate. The patient was located at: Home: [address] [place] [address]  The provider was located at:  Other: Rex Mckenzie NP

## 2023-01-26 ENCOUNTER — TELEPHONE (OUTPATIENT)
Dept: NEUROLOGY | Age: 48
End: 2023-01-26

## 2023-01-26 NOTE — TELEPHONE ENCOUNTER
Patient, Pasquale Hidden needs PA for. Are there any samples available for Aimovid? Pls. call  her if samples are available.     Aimovid    Relpax

## 2023-01-30 NOTE — TELEPHONE ENCOUNTER
RE:Aimovig  Key: [id number]      Rcvd notification via CMM that medication has been approved     Your request has been approved    Nurse notified

## 2023-02-15 ENCOUNTER — TELEPHONE (OUTPATIENT)
Dept: OBGYN CLINIC | Age: 48
End: 2023-02-15

## 2023-02-15 RX ORDER — METFORMIN HYDROCHLORIDE 500 MG/1
TABLET, EXTENDED RELEASE ORAL
Qty: 120 TABLET | Refills: 0 | Status: SHIPPED | OUTPATIENT
Start: 2023-02-15

## 2023-02-15 NOTE — TELEPHONE ENCOUNTER
56year old patient last seen in the office on 2/18/2022  and has next appointment for ae and mammogram on 4/17/2023     Please amend. sign the pended prescription     Thank you

## 2023-03-12 ENCOUNTER — TELEPHONE (OUTPATIENT)
Dept: OBGYN CLINIC | Age: 48
End: 2023-03-12

## 2023-03-13 RX ORDER — METFORMIN HYDROCHLORIDE 500 MG/1
TABLET, EXTENDED RELEASE ORAL
Qty: 120 TABLET | Refills: 0 | Status: SHIPPED | OUTPATIENT
Start: 2023-03-13

## 2023-03-13 NOTE — TELEPHONE ENCOUNTER
56year old patient last seen in the office on 2/18/2022 and has next appointment on 4/17/2023        Rx pended for amend/sign    Please advise    Thank you

## 2023-04-17 ENCOUNTER — OFFICE VISIT (OUTPATIENT)
Dept: OBGYN CLINIC | Age: 48
End: 2023-04-17
Payer: COMMERCIAL

## 2023-04-17 VITALS — DIASTOLIC BLOOD PRESSURE: 71 MMHG | BODY MASS INDEX: 23.69 KG/M2 | WEIGHT: 138 LBS | SYSTOLIC BLOOD PRESSURE: 106 MMHG

## 2023-04-17 DIAGNOSIS — Z01.419 ENCOUNTER FOR GYNECOLOGICAL EXAMINATION (GENERAL) (ROUTINE) WITHOUT ABNORMAL FINDINGS: Primary | ICD-10-CM

## 2023-04-17 DIAGNOSIS — Z91.89 AT RISK FOR DIABETES MELLITUS: ICD-10-CM

## 2023-04-17 DIAGNOSIS — E28.2 PCOS (POLYCYSTIC OVARIAN SYNDROME): ICD-10-CM

## 2023-04-17 PROCEDURE — 99396 PREV VISIT EST AGE 40-64: CPT | Performed by: OBSTETRICS & GYNECOLOGY

## 2023-04-17 RX ORDER — DROSPIRENONE AND ETHINYL ESTRADIOL 0.02-3(28)
1 KIT ORAL DAILY
Qty: 3 DOSE PACK | Refills: 4 | Status: SHIPPED | OUTPATIENT
Start: 2023-04-17

## 2023-04-17 RX ORDER — PHENTERMINE HYDROCHLORIDE 30 MG/1
CAPSULE ORAL
COMMUNITY
Start: 2023-03-31

## 2023-04-17 NOTE — PROGRESS NOTES
[facility]  [email or web address]  [phone]    Ivone Reyna MD, [address] [facility]     Annual Gynecologic Exam:  Chief Complaint   Patient presents with    Well Woman    Mammogram    Labs     2 hour fasting GTT       Javier Robison is a ,  56 y.o. female   Patient's last menstrual period was 2023. She presents for her annual checkup. She is having no significant problems. Per Rooming Note:  Patient's last menstrual period was 2023. Her periods are normal in flow and usually regular with a 26-32 day interval with 3-7 day duration. She does not have dysmenorrhea. Problems: no significant problems  Birth Control: OCP (estrogen/progesterone). Last Pap: normal obtained 1 year(s) ago. She does not have a history of VICKY 2, 3 or cervical cancer. Last Mammogram: had her mammogram today in our office. Sexual history and Contraception:  Social History     Substance and Sexual Activity   Sexual Activity Not Currently    Partners: Male    Birth control/protection: Pill       Past Medical History:   Diagnosis Date    Encounter for screening mammogram for breast cancer 2016; 17; 19    Normal; Normal; Normal    H/O diagnostic mammography 2022    left breast dx mammo WNL    Headache     History of mammogram 2019; 20    negative birads 2 dense; BI-RADS 1: Negative.     HX OTHER MEDICAL     infertility,unscpeified    Migraine headache     without aura    Pap smear for cervical cancer screening 3/11/10; 9/2/15; 18;     neg,hpv neg; Negative, HPV negative; Neg/HPV Neg; neg/hpv neg    PCOS (polycystic ovarian syndrome)      Current Outpatient Medications   Medication Sig    phentermine 30 mg capsule TAKE 1 CAPSULE (30 MG) BY ORAL ROUTE ONCE DAILY BEFORE BREAKFAST FOR 90 DAYS    drospirenone-ethinyl estradioL (Hali, 28,) 3-0.02 mg tab TAKE 1 TABLET BY MOUTH EVERY DAY    metFORMIN ER (GLUCOPHAGE XR) 500 mg tablet TAKE 4 TABLETS BY MOUTH DAILY (WITH DINNER). erenumab-aooe (Aimovig Autoinjector) 70 mg/mL injection 1 mL by SubCUTAneous route every thirty (30) days. eletriptan (Relpax) 40 mg tablet may repeat in 2 hours if necessary. No current facility-administered medications for this visit. OB History    Para Term  AB Living   2 1 1   1 1   SAB IAB Ectopic Molar Multiple Live Births   1         1      # Outcome Date GA Lbr Kaiden/2nd Weight Sex Delivery Anes PTL Lv   2 SAB            1 Term         FARHAN     Past Surgical History:   Procedure Laterality Date    HX OTHER SURGICAL  12    benign     Family History   Problem Relation Age of Onset    Elevated Lipids Mother     No Known Problems Father     Headache Brother      Social History     Socioeconomic History    Marital status:      Spouse name: Not on file    Number of children: Not on file    Years of education: Not on file    Highest education level: Not on file   Occupational History    Not on file   Tobacco Use    Smoking status: Never    Smokeless tobacco: Never   Vaping Use    Vaping Use: Never used   Substance and Sexual Activity    Alcohol use: Yes     Alcohol/week: 0.0 standard drinks     Comment: Beer/wine-socially. Drug use: No    Sexual activity: Not Currently     Partners: Male     Birth control/protection: Pill   Other Topics Concern    Not on file   Social History Narrative    Not on file     Social Determinants of Health     Financial Resource Strain: Not on file   Food Insecurity: Not on file   Transportation Needs: Not on file   Physical Activity: Not on file   Stress: Not on file   Social Connections: Not on file   Intimate Partner Violence: Not on file   Housing Stability: Not on file     Tobacco History:  reports that she has never smoked. She has never used smokeless tobacco.  Alcohol Abuse:  reports current alcohol use. Drug Abuse:  reports no history of drug use.   No Known Allergies    Patient Active Problem List   Diagnosis Code    Hirsutism L68.0    PCOS (polycystic ovarian syndrome) E28.2    Irregular menses N92.6    Migraine without aura, intractable, with status migrainosus G43.011       Review of Systems - History obtained from the patient and patient filled out questionnaire   Constitutional/general, HEENT, CV, Resp, GI, MSK, Neuro, Psych, Heme/lymph, Skin, Breast ROS: no significant complaints except as noted on HPI    Physical Exam  Visit Vitals  /71   Wt 138 lb (62.6 kg)   LMP 03/29/2023   BMI 23.69 kg/m²       Constitutional  Appearance: well-nourished, well developed, alert, in no acute distress    HENT  Head and Face: appears normal    Neck  Inspection/Palpation: normal appearance, no masses or tenderness  Lymph Nodes: no lymphadenopathy present  Thyroid: gland size normal, nontender, no nodules or masses present on palpation    Chest  Respiratory Effort: breathing unlabored  Auscultation: normal breath sounds    Cardiovascular  Heart:   Auscultation: regular rate and rhythm without murmur    Breasts  Inspection of Breasts: breasts symmetrical, no skin changes, no discharge present, nipple appearance normal, no skin retraction present  Palpation of Breasts and Axillae: no masses present on palpation, no breast tenderness  Axillary Lymph Nodes: no lymphadenopathy present    Gastrointestinal  Abdominal Examination: abdomen non-tender to palpation, normal bowel sounds, no masses present  Liver and spleen: no hepatomegaly present, spleen not palpable  Hernias: no hernias identified    Genitourinary  External Genitalia: normal appearance for age, no discharge present, no tenderness present, no inflammatory lesions present, no masses present  Vagina: normal vaginal vault without central or paravaginal defects, minimal discharge present, no inflammatory lesions present, no masses present  Bladder: non-tender to palpation  Urethra: appears normal  Cervix: normal   Uterus: normal size, shape and consistency  Adnexa: no adnexal tenderness present, no adnexal masses present  Perineum: perineum within normal limits, no evidence of trauma, no rashes or skin lesions present  Anus: anus within normal limits, no hemorrhoids present  Inguinal Lymph Nodes: no lymphadenopathy present    Skin  General Inspection: no rash, no lesions identified    Neurologic/Psychiatric  Mental Status:  Orientation: grossly oriented to person, place and time  Mood and Affect: mood normal, affect appropriate    Assessment:  56 y.o.  for well woman exam  Her current medical status is satisfactory with no evidence of significant gynecologic issues. Encounter Diagnoses   Name Primary? PCOS (polycystic ovarian syndrome)     At risk for diabetes mellitus     Encounter for gynecological examination (general) (routine) without abnormal findings Yes       Plan:  I recommended follow up one year for routine annual gynecologic exam or sooner prn  Patient should follow up with a primary care physician for chronic medical problems and evaluation of non-gynecologic concerns and to please contact our office with any GYN questions or concerns. I recommend STD testing per CDC guidelines and at patient request.   2hr gtt  Discussed risks, benefits and alternatives of OCP/nuvaring/patch: including but not limited to dvt/pe/mi/cva/ca/gi risks. She is encouraged to read package insert and to follow up with me or her pharmacist with any questions or concerns. Disc potential increase risks with buzz/everardo and interaction with other medications and potassium levels. Pt wants to continue on OCP  We discussed typical perimenopausal bleeding patterns and symptoms. I recommend that she notify MD for chaotic or symptomatic bleeding, or bleeding in between menses or intermenstrual bleeding for additional evaluation.   She can also schedule a consult to discuss management of symptoms of perimenopause that are concerning her or interfering with her life or day to day function or activity. Disc risk factors for DM      Folllow up:  [x] return for annual well woman exam in one year or sooner if she is having problems  [] follow up and ultrasound  [] 6 months  [] 3 months  [] 6 weeks   [] 1 month    Orders Placed This Encounter    GLUCOSE FASTING AND 2HR ([facility] Default)       No results found for any visits on 04/17/23.

## 2023-04-17 NOTE — PROGRESS NOTES
Gretel Fajardo is a 56 y.o. female returns for an annual exam     Chief Complaint   Patient presents with    Well Woman    Mammogram    Labs     2 hour fasting GTT       Patient's last menstrual period was 03/29/2023. Her periods are normal in flow and usually regular with a 26-32 day interval with 3-7 day duration. She does not have dysmenorrhea. Problems: no significant problems  Birth Control: OCP (estrogen/progesterone). Last Pap: normal obtained 1 year(s) ago. She does not have a history of VICKY 2, 3 or cervical cancer. Last Mammogram: had her mammogram today in our office. 1. Have you been to the ER, urgent care clinic, or hospitalized since your last visit? No    2. Have you seen or consulted any other health care providers outside of the [address] [place] [address] since your last visit? No    Examination chaperoned by Josie Narvaez LPN.

## 2023-04-18 LAB
GLUCOSE 2H P MEAL SERPL-MCNC: 69 MG/DL (ref 70–139)
GLUCOSE P FAST SERPL-MCNC: 81 MG/DL (ref 70–99)

## 2023-04-20 LAB
ALBUMIN SERPL-MCNC: 4.4 G/DL (ref 3.5–5)
ALBUMIN/GLOB SERPL: 1.5 (ref 1.1–2.2)
ALP SERPL-CCNC: 69 U/L (ref 45–117)
ALT SERPL-CCNC: 29 U/L (ref 12–78)
ANION GAP SERPL CALC-SCNC: 4 MMOL/L (ref 5–15)
AST SERPL-CCNC: 22 U/L (ref 15–37)
BASOPHILS # BLD: 0.1 K/UL (ref 0–0.1)
BASOPHILS NFR BLD: 1 % (ref 0–1)
BILIRUB SERPL-MCNC: 0.5 MG/DL (ref 0.2–1)
BUN SERPL-MCNC: 16 MG/DL (ref 6–20)
BUN/CREAT SERPL: 21 (ref 12–20)
CALCIUM SERPL-MCNC: 9.8 MG/DL (ref 8.5–10.1)
CHLORIDE SERPL-SCNC: 106 MMOL/L (ref 97–108)
CHOLEST SERPL-MCNC: 196 MG/DL
CO2 SERPL-SCNC: 27 MMOL/L (ref 21–32)
CREAT SERPL-MCNC: 0.75 MG/DL (ref 0.55–1.02)
DIFFERENTIAL METHOD BLD: ABNORMAL
EOSINOPHIL # BLD: 0.1 K/UL (ref 0–0.4)
EOSINOPHIL NFR BLD: 3 % (ref 0–7)
ERYTHROCYTE [DISTWIDTH] IN BLOOD BY AUTOMATED COUNT: 12 % (ref 11.5–14.5)
GLOBULIN SER CALC-MCNC: 2.9 G/DL (ref 2–4)
GLUCOSE SERPL-MCNC: 87 MG/DL (ref 65–100)
HCT VFR BLD AUTO: 45.2 % (ref 35–47)
HDLC SERPL-MCNC: 73 MG/DL
HDLC SERPL: 2.7 (ref 0–5)
HGB BLD-MCNC: 14.3 G/DL (ref 11.5–16)
IMM GRANULOCYTES # BLD AUTO: 0 K/UL (ref 0–0.04)
IMM GRANULOCYTES NFR BLD AUTO: 0 % (ref 0–0.5)
LDLC SERPL CALC-MCNC: 111.4 MG/DL (ref 0–100)
LYMPHOCYTES # BLD: 1.4 K/UL (ref 0.8–3.5)
LYMPHOCYTES NFR BLD: 31 % (ref 12–49)
MCH RBC QN AUTO: 31.6 PG (ref 26–34)
MCHC RBC AUTO-ENTMCNC: 31.6 G/DL (ref 30–36.5)
MCV RBC AUTO: 99.8 FL (ref 80–99)
MONOCYTES # BLD: 0.4 K/UL (ref 0–1)
MONOCYTES NFR BLD: 9 % (ref 5–13)
NEUTS SEG # BLD: 2.5 K/UL (ref 1.8–8)
NEUTS SEG NFR BLD: 56 % (ref 32–75)
NRBC # BLD: 0 K/UL (ref 0–0.01)
NRBC BLD-RTO: 0 PER 100 WBC
PLATELET # BLD AUTO: 256 K/UL (ref 150–400)
PMV BLD AUTO: 10.9 FL (ref 8.9–12.9)
POTASSIUM SERPL-SCNC: 4.6 MMOL/L (ref 3.5–5.1)
PROT SERPL-MCNC: 7.3 G/DL (ref 6.4–8.2)
RBC # BLD AUTO: 4.53 M/UL (ref 3.8–5.2)
SODIUM SERPL-SCNC: 137 MMOL/L (ref 136–145)
TRIGL SERPL-MCNC: 58 MG/DL (ref ?–150)
VLDLC SERPL CALC-MCNC: 11.6 MG/DL
WBC # BLD AUTO: 4.6 K/UL (ref 3.6–11)

## 2023-04-27 LAB
ALBUMIN SERPL-MCNC: NORMAL G/DL (ref 3.5–5)
ALBUMIN/GLOB SERPL: NORMAL (ref 1.1–2.2)
ALP SERPL-CCNC: NORMAL U/L (ref 45–117)
ALT SERPL-CCNC: NORMAL U/L (ref 12–78)
ANION GAP SERPL CALC-SCNC: NORMAL MMOL/L (ref 5–15)
AST SERPL-CCNC: NORMAL U/L (ref 15–37)
BASOPHILS # BLD: NORMAL K/UL
BASOPHILS NFR BLD: NORMAL % (ref 0–1)
BILIRUB SERPL-MCNC: NORMAL MG/DL (ref 0.2–1)
BUN SERPL-MCNC: NORMAL MG/DL (ref 6–20)
BUN/CREAT SERPL: NORMAL (ref 12–20)
CALCIUM SERPL-MCNC: NORMAL MG/DL (ref 8.5–10.1)
CHLORIDE SERPL-SCNC: NORMAL MMOL/L (ref 97–108)
CHOLEST SERPL-MCNC: NORMAL MG/DL
CO2 SERPL-SCNC: NORMAL MMOL/L (ref 21–32)
CREAT SERPL-MCNC: NORMAL MG/DL (ref 0.55–1.02)
DIFFERENTIAL METHOD BLD: NORMAL
EOSINOPHIL # BLD: NORMAL K/UL
EOSINOPHIL NFR BLD: NORMAL % (ref 0–7)
ERYTHROCYTE [DISTWIDTH] IN BLOOD BY AUTOMATED COUNT: NORMAL % (ref 11.5–14.5)
GLOBULIN SER CALC-MCNC: NORMAL G/DL (ref 2–4)
GLUCOSE SERPL-MCNC: NORMAL MG/DL (ref 65–100)
HCT VFR BLD AUTO: NORMAL % (ref 35–47)
HDLC SERPL-MCNC: NORMAL MG/DL
HDLC SERPL: NORMAL (ref 0–5)
HGB BLD-MCNC: NORMAL G/DL (ref 11.5–16)
IMM GRANULOCYTES # BLD AUTO: NORMAL K/UL (ref 0–0.04)
IMM GRANULOCYTES NFR BLD AUTO: NORMAL % (ref 0–0.5)
LDLC SERPL CALC-MCNC: NORMAL MG/DL (ref 0–100)
LYMPHOCYTES # BLD: NORMAL K/UL
LYMPHOCYTES NFR BLD: NORMAL % (ref 12–49)
MCH RBC QN AUTO: NORMAL PG (ref 26–34)
MCHC RBC AUTO-ENTMCNC: NORMAL G/DL (ref 30–36.5)
MCV RBC AUTO: NORMAL FL (ref 80–99)
MONOCYTES # BLD: NORMAL K/UL
MONOCYTES NFR BLD: NORMAL % (ref 5–13)
NEUTS SEG # BLD: NORMAL K/UL (ref 1.8–8)
NEUTS SEG NFR BLD: NORMAL % (ref 32–75)
NRBC # BLD: NORMAL K/UL (ref 0–0.01)
NRBC BLD-RTO: NORMAL PER 100 WBC
PLATELET # BLD AUTO: NORMAL K/UL (ref 150–400)
PMV BLD AUTO: NORMAL FL (ref 8.9–12.9)
POTASSIUM SERPL-SCNC: NORMAL MMOL/L (ref 3.5–5.1)
PROT SERPL-MCNC: NORMAL G/DL (ref 6.4–8.2)
RBC # BLD AUTO: NORMAL M/UL (ref 3.8–5.2)
SODIUM SERPL-SCNC: NORMAL MMOL/L (ref 136–145)
TRIGL SERPL-MCNC: NORMAL MG/DL (ref ?–150)
VLDLC SERPL CALC-MCNC: NORMAL MG/DL
WBC # BLD AUTO: NORMAL K/UL (ref 3.6–11)

## 2023-05-07 ENCOUNTER — TELEPHONE (OUTPATIENT)
Age: 48
End: 2023-05-07

## 2023-05-08 RX ORDER — ETHINYL ESTRADIOL/DROSPIRENONE 0.02-3(28)
TABLET ORAL
Qty: 3 PACKET | Refills: 3 | Status: SHIPPED | OUTPATIENT
Start: 2023-05-08

## 2023-05-08 NOTE — TELEPHONE ENCOUNTER
56year old patient last seen in the office on 4/17/2023    Please amend/sign the pharmacy sent medication    Thank you
Prescription sent by MD
Discharged

## 2023-05-11 LAB — COLONOSCOPY, EXTERNAL: NORMAL

## 2023-07-31 ENCOUNTER — TELEPHONE (OUTPATIENT)
Age: 48
End: 2023-07-31

## 2023-07-31 DIAGNOSIS — Z91.89 AT RISK FOR DIABETES MELLITUS: ICD-10-CM

## 2023-07-31 DIAGNOSIS — Z13.1 DIABETES MELLITUS SCREENING: Primary | ICD-10-CM

## 2023-07-31 NOTE — TELEPHONE ENCOUNTER
53year old patient last seen in the office on 4/17/2023 for ae    Please advised of pended rx refill from the pharmacy     Please amend/sign    Thank you

## 2023-08-01 NOTE — TELEPHONE ENCOUNTER
Deborah Fabry, MD  to Me    TP    10:06 PM  Did she do/schedule a fasting 2hr gtt?      TRP            Patient was sent a my chart message to set up appointment for 2 hour GTT    [place]

## 2023-08-02 RX ORDER — METFORMIN HYDROCHLORIDE 500 MG/1
TABLET, EXTENDED RELEASE ORAL
Qty: 120 TABLET | Refills: 3 | Status: SHIPPED | OUTPATIENT
Start: 2023-08-02

## 2023-08-02 NOTE — TELEPHONE ENCOUNTER
Other lab work from 4/19/23 (cbc w diff, lipid, & cmp was ordered by Kenrick Mata,  - not our office)

## 2023-08-02 NOTE — TELEPHONE ENCOUNTER
This nurse called patient and patient reports she did the 2 hour GTT at her ae on 4/17/2023      Patient discussed how her results were in the chart and now not and that lab work in her chart for 4/19/2023 is not hers

## 2023-11-02 ENCOUNTER — TELEPHONE (OUTPATIENT)
Age: 48
End: 2023-11-02

## 2023-11-02 RX ORDER — METFORMIN HYDROCHLORIDE 500 MG/1
TABLET, EXTENDED RELEASE ORAL
Qty: 360 TABLET | Refills: 1 | Status: SHIPPED | OUTPATIENT
Start: 2023-11-02

## 2023-11-02 NOTE — TELEPHONE ENCOUNTER
53year old  patient last seen in the office on 4/17/2023 for ae    Patient did 2 hour GTT 8/2023    ?  Ok to refill pended rx from the pharmacy      Need follow up or should she get from PCP    Please amend/sign/refuse

## 2023-11-08 ENCOUNTER — OFFICE VISIT (OUTPATIENT)
Age: 48
End: 2023-11-08

## 2023-11-08 VITALS
WEIGHT: 142 LBS | SYSTOLIC BLOOD PRESSURE: 116 MMHG | HEIGHT: 64 IN | OXYGEN SATURATION: 98 % | RESPIRATION RATE: 18 BRPM | TEMPERATURE: 99 F | HEART RATE: 92 BPM | BODY MASS INDEX: 24.24 KG/M2 | DIASTOLIC BLOOD PRESSURE: 75 MMHG

## 2023-11-08 DIAGNOSIS — J02.9 SORE THROAT: Primary | ICD-10-CM

## 2023-11-08 DIAGNOSIS — J02.9 PHARYNGITIS, UNSPECIFIED ETIOLOGY: ICD-10-CM

## 2023-11-08 LAB
HETEROPHILE ANTIBODIES: NORMAL
STREP PYOGENES DNA, POC: NEGATIVE
VALID INTERNAL CONTROL, POC: YES

## 2023-11-08 RX ORDER — PHENTERMINE HYDROCHLORIDE 30 MG/1
CAPSULE ORAL
COMMUNITY
Start: 2023-03-31

## 2023-11-08 NOTE — PROGRESS NOTES
Cassidy Constantino (:  1975) is a 53 y.o. female,New patient, here for evaluation of the following chief complaint(s):  Pharyngitis (Monday night started, hurts to swallow, eating causes pain, muscles aches on Monday night)      ASSESSMENT/PLAN:  Visit Diagnoses and Associated Orders       Sore throat    -  Primary    AMB POC STREP GO A DIRECT, DNA PROBE [81920 CPT(R)]           Pharyngitis, unspecified etiology        Strep A culture, throat [72133 Custom]           ORDERS WITHOUT AN ASSOCIATED DIAGNOSIS    phentermine 30 MG capsule [6232]            Results for orders placed or performed in visit on 23   AMB POC STREP GO A DIRECT, DNA PROBE   Result Value Ref Range    Valid Internal Control, POC yes     Strep pyogenes DNA, POC Negative Negative   POCT Infectious mononucleosis Abs (mono)   Result Value Ref Range    Monospot Neg       Instructed patient to gargle with warm salty water. Use throat lozenges for sore throat. Avoid salty and spicy foods. Stay hydrated and increase your fluids to prevent dehydration. Follow up in 3-5- days if symptoms persist, start to experience difficulty swallowing and running a fever of 100 degrees F. Results for orders placed or performed in visit on 23   AMB POC STREP GO A DIRECT, DNA PROBE   Result Value Ref Range    Valid Internal Control, POC yes     Strep pyogenes DNA, POC Negative Negative   POCT Infectious mononucleosis Abs (mono)   Result Value Ref Range    Monospot Neg         dayss if symptoms persist or if symptoms worsen. SUBJECTIVE/OBJECTIVE:  HPI  HPI:   53 y.o. female presents with symptoms of Sore Throat  Patient complains of sore throat. Associated symptoms include sore throat and swollen glands. Onset of symptoms was 4 days ago, unchanged since that time. She is not drinking much. She has not had recent close exposure to someone with proven streptococcal pharyngitis. Patient denies running a fever.  She rates her pain as a 6/10 on the pain

## 2023-11-11 LAB — S PYO THROAT QL CULT: NORMAL

## 2023-11-13 ENCOUNTER — TELEPHONE (OUTPATIENT)
Age: 48
End: 2023-11-13

## 2023-11-13 DIAGNOSIS — J02.9 PHARYNGITIS, UNSPECIFIED ETIOLOGY: Primary | ICD-10-CM

## 2023-11-13 LAB — S PYO THROAT QL CULT: ABNORMAL

## 2023-11-13 RX ORDER — AMOXICILLIN 500 MG/1
500 CAPSULE ORAL 2 TIMES DAILY
Qty: 20 CAPSULE | Refills: 0 | Status: SHIPPED | OUTPATIENT
Start: 2023-11-13 | End: 2023-11-23

## 2023-11-13 NOTE — TELEPHONE ENCOUNTER
Please call inform patient, throat culture shows bacteria. Amoxicillin prescription faxed to pharmacy on chart.   Thanks

## 2023-12-11 ENCOUNTER — OFFICE VISIT (OUTPATIENT)
Facility: CLINIC | Age: 48
End: 2023-12-11
Payer: COMMERCIAL

## 2023-12-11 VITALS
BODY MASS INDEX: 24.75 KG/M2 | HEART RATE: 79 BPM | WEIGHT: 145 LBS | TEMPERATURE: 98 F | HEIGHT: 64 IN | OXYGEN SATURATION: 98 % | RESPIRATION RATE: 20 BRPM | DIASTOLIC BLOOD PRESSURE: 86 MMHG | SYSTOLIC BLOOD PRESSURE: 119 MMHG

## 2023-12-11 DIAGNOSIS — G47.00 MIDDLE INSOMNIA: ICD-10-CM

## 2023-12-11 DIAGNOSIS — E28.2 PCOS (POLYCYSTIC OVARIAN SYNDROME): Primary | ICD-10-CM

## 2023-12-11 DIAGNOSIS — G43.011 MIGRAINE WITHOUT AURA, INTRACTABLE, WITH STATUS MIGRAINOSUS: ICD-10-CM

## 2023-12-11 PROCEDURE — 99204 OFFICE O/P NEW MOD 45 MIN: CPT | Performed by: FAMILY MEDICINE

## 2023-12-11 PROCEDURE — 90694 VACC AIIV4 NO PRSRV 0.5ML IM: CPT | Performed by: FAMILY MEDICINE

## 2023-12-11 PROCEDURE — 90471 IMMUNIZATION ADMIN: CPT | Performed by: FAMILY MEDICINE

## 2023-12-11 SDOH — HEALTH STABILITY: PHYSICAL HEALTH: ON AVERAGE, HOW MANY MINUTES DO YOU ENGAGE IN EXERCISE AT THIS LEVEL?: 60 MIN

## 2023-12-11 SDOH — HEALTH STABILITY: PHYSICAL HEALTH: ON AVERAGE, HOW MANY DAYS PER WEEK DO YOU ENGAGE IN MODERATE TO STRENUOUS EXERCISE (LIKE A BRISK WALK)?: 7 DAYS

## 2023-12-11 ASSESSMENT — PATIENT HEALTH QUESTIONNAIRE - PHQ9
SUM OF ALL RESPONSES TO PHQ QUESTIONS 1-9: 0
SUM OF ALL RESPONSES TO PHQ QUESTIONS 1-9: 0
SUM OF ALL RESPONSES TO PHQ9 QUESTIONS 1 & 2: 0
SUM OF ALL RESPONSES TO PHQ QUESTIONS 1-9: 0
2. FEELING DOWN, DEPRESSED OR HOPELESS: 0
SUM OF ALL RESPONSES TO PHQ QUESTIONS 1-9: 0
1. LITTLE INTEREST OR PLEASURE IN DOING THINGS: 0

## 2023-12-11 NOTE — PROGRESS NOTES
Family Medicine Initial Office Visit  Patient: Mireya Washington  1975, 53 y.o., female  Encounter Date: 12/11/2023    ASSESSMENT & PLAN  Mireya Washington is a 53 y.o. female  who presented for established care with below concerns:    1. PCOS (polycystic ovarian syndrome)  Assessment & Plan:   Well-controlled, continue current medications and continue current treatment plan  2. Middle insomnia  Assessment & Plan:  Multifactorial, likely psychologic and could have iatrogenic source as well, recommend talking with weight loss provider about game plan for phentermine as it may be lowering threshold to wake up at night. Try lifestyle changes sleep hygiene, and try not having dog in bed as well. 3. Migraine without aura, intractable, with status migrainosus  Assessment & Plan:   Well-controlled, continue current medications        Return in about 3 months (around 3/11/2024) for sleep/insomnia management. There are no Patient Instructions on file for this visit. CHIEF COMPLAINT  Chief Complaint   Patient presents with    New Patient     Np to practice previous imac pt. Having issues staying asleep for the last year. SUBJECTIVE  Mireya Washington is a 53 y.o. female presenting today for establishing care. 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 Patient hobbies include social hangouts with friends; considers the workout time a social outing; concerts, hiking. Patient lives in a house with dog;  passed away 3.5 years ago; daughter is currently away at college. Exercise: run 20 miles 3 days per week, 1-2 days strength training, 2 days a week boot camp style HIIT workout. Diet / Weight:intermittent fasting, limit carbs, 100g of proteins.  Also utilizes phentermine 30mg daily before breakfast.    Social History     Tobacco Use   Smoking Status Never   Smokeless Tobacco Never     Social History     Substance and Sexual

## 2023-12-11 NOTE — PROGRESS NOTES
Chief Complaint   Patient presents with    New Patient     Np to practice previous imac pt. Having issues staying asleep for the last year.

## 2023-12-11 NOTE — ASSESSMENT & PLAN NOTE
Multifactorial, likely psychologic and could have iatrogenic source as well, recommend talking with weight loss provider about game plan for phentermine as it may be lowering threshold to wake up at night. Try lifestyle changes sleep hygiene, and try not having dog in bed as well.

## 2024-02-26 DIAGNOSIS — G43.011 MIGRAINE WITHOUT AURA, INTRACTABLE, WITH STATUS MIGRAINOSUS: ICD-10-CM

## 2024-02-26 RX ORDER — ERENUMAB-AOOE 70 MG/ML
INJECTION SUBCUTANEOUS
Qty: 3 ML | Refills: 3 | Status: SHIPPED | OUTPATIENT
Start: 2024-02-26

## 2024-02-27 ENCOUNTER — TELEPHONE (OUTPATIENT)
Age: 49
End: 2024-02-27

## 2024-02-27 NOTE — TELEPHONE ENCOUNTER
Re: Aimovig    Rcvd PA in [facility] but there was an error message, created case in [facility] Key# [id number], submitted to [facility] directly. Awaiting outcome.

## 2024-03-05 ENCOUNTER — OFFICE VISIT (OUTPATIENT)
Age: 49
End: 2024-03-05
Payer: COMMERCIAL

## 2024-03-05 VITALS
SYSTOLIC BLOOD PRESSURE: 118 MMHG | RESPIRATION RATE: 20 BRPM | HEART RATE: 76 BPM | DIASTOLIC BLOOD PRESSURE: 82 MMHG | OXYGEN SATURATION: 98 %

## 2024-03-05 DIAGNOSIS — G43.011 MIGRAINE WITHOUT AURA, INTRACTABLE, WITH STATUS MIGRAINOSUS: Primary | ICD-10-CM

## 2024-03-05 PROCEDURE — 99213 OFFICE O/P EST LOW 20 MIN: CPT

## 2024-03-05 NOTE — PROGRESS NOTES
Migraines- almost nonexistent since the Aimovig   When she is on her cycle she does notice that she has slight headache but that is the only thing she has noticed within the last several months

## 2024-03-05 NOTE — PROGRESS NOTES
Juliet Domínguez is a 48 y.o. female who presents with the following  Chief Complaint   Patient presents with    Follow-up     Migraines        HPI  Patient is here today for migraine follow-up.  She was last seen January 17, 2023 by [facility] at which time the patient was established on Aimovig 70 mg and was doing well with hardly any migraines occurring.  She would take ibuprofen for rescue therapy for regular dull headaches.  She said that when she did have migraines the severity was low.  She did have Elitriptan for rescue therapy when needed.  All of her medicines stay the same.  Today the patient states that she has not had any migraines since her last visit with Juliet.  She continues to use Aimovig 70 mg monthly for migraine prevention.  She continues to have Elitriptan for rescue therapy as needed.  She also is just using over-the-counter ibuprofen for dull headaches that she gets around her menstrual cycle.      No Known Allergies    Current Outpatient Medications   Medication Sig Dispense Refill    AIMOVIG 70 MG/ML SOAJ 1 ML BY SUBCUTANEOUS ROUTE EVERY THIRTY (30) DAYS. 3 mL 3    phentermine 30 MG capsule TAKE 1 CAPSULE (30 MG) BY ORAL ROUTE ONCE DAILY BEFORE BREAKFAST FOR 90 DAYS      metFORMIN (GLUCOPHAGE-XR) 500 MG extended release tablet TAKE 4 TABLETS BY MOUTH DAILY (WITH DINNER). 360 tablet 1    MARTIN 3-0.02 MG per tablet TAKE 1 TABLET BY MOUTH EVERY DAY 3 packet 3    eletriptan (RELPAX) 40 MG tablet        No current facility-administered medications for this visit.        Social History     Tobacco Use   Smoking Status Never   Smokeless Tobacco Never       Past Medical History:   Diagnosis Date    Encounter for screening mammogram for breast cancer 11/28/2016; 11/29/17; 12/26/18    Normal; Normal; Normal    H/O diagnostic mammography 04/25/2022    left breast dx mammo WNL    H/O mammogram 04/17/2023    low risk/Heterogeneously dense    Headache     History of mammogram 12/30/2019; 12/31/20    negative

## 2024-03-15 NOTE — TELEPHONE ENCOUNTER
Re: Aimovig    Rcvd PA approval, auth# [id number], effective 12/27/23-02/26/25, scanned to chart. Fyi to nurse.

## 2024-03-25 RX ORDER — METFORMIN HYDROCHLORIDE 500 MG/1
TABLET, EXTENDED RELEASE ORAL
Qty: 360 TABLET | Refills: 1 | OUTPATIENT
Start: 2024-03-25

## 2024-04-18 SDOH — ECONOMIC STABILITY: HOUSING INSECURITY
IN THE LAST 12 MONTHS, WAS THERE A TIME WHEN YOU DID NOT HAVE A STEADY PLACE TO SLEEP OR SLEPT IN A SHELTER (INCLUDING NOW)?: NO

## 2024-04-18 SDOH — ECONOMIC STABILITY: FOOD INSECURITY: WITHIN THE PAST 12 MONTHS, YOU WORRIED THAT YOUR FOOD WOULD RUN OUT BEFORE YOU GOT MONEY TO BUY MORE.: NEVER TRUE

## 2024-04-18 SDOH — ECONOMIC STABILITY: TRANSPORTATION INSECURITY
IN THE PAST 12 MONTHS, HAS LACK OF TRANSPORTATION KEPT YOU FROM MEETINGS, WORK, OR FROM GETTING THINGS NEEDED FOR DAILY LIVING?: NO

## 2024-04-18 SDOH — ECONOMIC STABILITY: FOOD INSECURITY: WITHIN THE PAST 12 MONTHS, THE FOOD YOU BOUGHT JUST DIDN'T LAST AND YOU DIDN'T HAVE MONEY TO GET MORE.: NEVER TRUE

## 2024-04-18 SDOH — ECONOMIC STABILITY: INCOME INSECURITY: HOW HARD IS IT FOR YOU TO PAY FOR THE VERY BASICS LIKE FOOD, HOUSING, MEDICAL CARE, AND HEATING?: NOT VERY HARD

## 2024-04-19 ENCOUNTER — OFFICE VISIT (OUTPATIENT)
Facility: CLINIC | Age: 49
End: 2024-04-19
Payer: COMMERCIAL

## 2024-04-19 ENCOUNTER — OFFICE VISIT (OUTPATIENT)
Age: 49
End: 2024-04-19
Payer: COMMERCIAL

## 2024-04-19 VITALS
RESPIRATION RATE: 16 BRPM | HEART RATE: 74 BPM | WEIGHT: 145 LBS | DIASTOLIC BLOOD PRESSURE: 78 MMHG | TEMPERATURE: 97.5 F | HEIGHT: 64 IN | OXYGEN SATURATION: 99 % | SYSTOLIC BLOOD PRESSURE: 121 MMHG | BODY MASS INDEX: 24.75 KG/M2

## 2024-04-19 VITALS — BODY MASS INDEX: 24.89 KG/M2 | DIASTOLIC BLOOD PRESSURE: 83 MMHG | SYSTOLIC BLOOD PRESSURE: 123 MMHG | WEIGHT: 145 LBS

## 2024-04-19 DIAGNOSIS — E28.2 PCOS (POLYCYSTIC OVARIAN SYNDROME): ICD-10-CM

## 2024-04-19 DIAGNOSIS — G47.00 MIDDLE INSOMNIA: Primary | ICD-10-CM

## 2024-04-19 DIAGNOSIS — Z01.419 ENCOUNTER FOR GYNECOLOGICAL EXAMINATION: Primary | ICD-10-CM

## 2024-04-19 PROBLEM — E78.00 PURE HYPERCHOLESTEROLEMIA: Status: ACTIVE | Noted: 2024-04-19

## 2024-04-19 PROCEDURE — 99396 PREV VISIT EST AGE 40-64: CPT | Performed by: OBSTETRICS & GYNECOLOGY

## 2024-04-19 PROCEDURE — 99214 OFFICE O/P EST MOD 30 MIN: CPT | Performed by: FAMILY MEDICINE

## 2024-04-19 RX ORDER — TRAZODONE HYDROCHLORIDE 50 MG/1
50 TABLET ORAL NIGHTLY PRN
Qty: 30 TABLET | Refills: 0 | Status: SHIPPED | OUTPATIENT
Start: 2024-04-19

## 2024-04-19 RX ORDER — METFORMIN HYDROCHLORIDE 500 MG/1
TABLET, EXTENDED RELEASE ORAL
Qty: 120 TABLET | Refills: 3 | Status: SHIPPED | OUTPATIENT
Start: 2024-04-19

## 2024-04-19 RX ORDER — DROSPIRENONE AND ETHINYL ESTRADIOL 0.02-3(28)
1 KIT ORAL DAILY
Qty: 3 PACKET | Refills: 4 | Status: SHIPPED | OUTPATIENT
Start: 2024-04-19

## 2024-04-19 SDOH — ECONOMIC STABILITY: FOOD INSECURITY: WITHIN THE PAST 12 MONTHS, THE FOOD YOU BOUGHT JUST DIDN'T LAST AND YOU DIDN'T HAVE MONEY TO GET MORE.: NEVER TRUE

## 2024-04-19 SDOH — ECONOMIC STABILITY: FOOD INSECURITY: WITHIN THE PAST 12 MONTHS, YOU WORRIED THAT YOUR FOOD WOULD RUN OUT BEFORE YOU GOT MONEY TO BUY MORE.: NEVER TRUE

## 2024-04-19 SDOH — ECONOMIC STABILITY: INCOME INSECURITY: HOW HARD IS IT FOR YOU TO PAY FOR THE VERY BASICS LIKE FOOD, HOUSING, MEDICAL CARE, AND HEATING?: NOT HARD AT ALL

## 2024-04-19 ASSESSMENT — PATIENT HEALTH QUESTIONNAIRE - PHQ9
SUM OF ALL RESPONSES TO PHQ QUESTIONS 1-9: 0
1. LITTLE INTEREST OR PLEASURE IN DOING THINGS: NOT AT ALL
2. FEELING DOWN, DEPRESSED OR HOPELESS: NOT AT ALL
SUM OF ALL RESPONSES TO PHQ QUESTIONS 1-9: 0
SUM OF ALL RESPONSES TO PHQ QUESTIONS 1-9: 0
SUM OF ALL RESPONSES TO PHQ9 QUESTIONS 1 & 2: 0
SUM OF ALL RESPONSES TO PHQ QUESTIONS 1-9: 0

## 2024-04-19 NOTE — PROGRESS NOTES
Juliet Domínguez is a 48 y.o. female returns for an annual exam     Chief Complaint   Patient presents with    Annual Exam       Patient's last menstrual period was 04/13/2024.  Her periods are light in flow and usually regular with a 26-32 day interval with 3-7 day duration.  She does not have dysmenorrhea.  Problems: no problems  Birth Control: OCP (estrogen/progesterone).  Last Pap: see report obtained 2 year(s) ago.  She does not have a history of FLORECITA 2, 3 or cervical cancer.   Last Mammogram: had her mammogram today in our office.  It was see report.   Last colonoscopy: see report obtained 1 year(s) ago.      1. Have you been to the ER, urgent care clinic, or hospitalized since your last visit? No    2. Have you seen or consulted any other health care providers outside of the [facility] since your last visit? No    Examination chaperoned by Arianna Doherty LPN.

## 2024-04-19 NOTE — PATIENT INSTRUCTIONS
repair.    Nose-breathing has also been shown to increase Nitric Oxide which is made in our sinuses.

## 2024-04-19 NOTE — PROGRESS NOTES
[facility]  [email or web address]  [phone]    Virginia Clement MD, FACOG        Annual Gynecologic Exam:  Chief Complaint   Patient presents with    Annual Exam       Juliet Domínguez is a No obstetric history on file.,  48 y.o. female   Patient's last menstrual period was 04/13/2024.    The patient presents for her annual gynecologic checkup.     The patient is having no problems.  Happy on OCP, needs metformin refilled.  Had DM screen 2023.      Per Rooming Note:  Patient's last menstrual period was 04/13/2024.  Her periods are light in flow and usually regular with a 26-32 day interval with 3-7 day duration.  She does not have dysmenorrhea.  Problems: no problems  Birth Control: OCP (estrogen/progesterone).  Last Pap: see report obtained 2 year(s) ago.  She does not have a history of FLORECITA 2, 3 or cervical cancer.   Last Mammogram: had her mammogram today in our office.  It was see report.   Last colonoscopy: see report obtained 1 year(s) ago.    Sexual history and Contraception:    Social History     Substance and Sexual Activity   Sexual Activity Not Currently    Birth control/protection: Pill      Past Medical History:   Diagnosis Date    Encounter for screening mammogram for breast cancer 11/28/2016; 11/29/17; 12/26/18    Normal; Normal; Normal    H/O diagnostic mammography 04/25/2022    left breast dx mammo WNL    H/O mammogram 04/17/2023    low risk/Heterogeneously dense    Headache     History of mammogram 12/30/2019; 12/31/20    negative birads 2 dense; BI-RADS 1: Negative.    Migraine headache     without aura    Pap smear for cervical cancer screening 3/11/10; 9/2/15; 12/26/18; 2/18/2    neg,hpv neg; Negative, HPV negative; Neg/HPV Neg; neg/hpv neg    PCOS (polycystic ovarian syndrome)     Pure hypercholesterolemia 4/19/2024     Current Outpatient Medications   Medication Sig Dispense Refill    drospirenone-ethinyl estradiol (MARTIN) 3-0.02 MG per tablet Take 1 tablet by mouth

## 2024-05-11 DIAGNOSIS — G47.00 MIDDLE INSOMNIA: ICD-10-CM

## 2024-05-13 RX ORDER — TRAZODONE HYDROCHLORIDE 50 MG/1
50 TABLET ORAL NIGHTLY
Qty: 90 TABLET | Refills: 0 | Status: SHIPPED | OUTPATIENT
Start: 2024-05-13

## 2024-08-14 RX ORDER — METFORMIN HYDROCHLORIDE 500 MG/1
TABLET, EXTENDED RELEASE ORAL
Qty: 360 TABLET | Refills: 1 | OUTPATIENT
Start: 2024-08-14

## 2025-01-31 ENCOUNTER — PATIENT MESSAGE (OUTPATIENT)
Age: 50
End: 2025-01-31

## 2025-02-03 RX ORDER — METFORMIN HYDROCHLORIDE 500 MG/1
TABLET, EXTENDED RELEASE ORAL
Qty: 120 TABLET | Refills: 0 | Status: SHIPPED | OUTPATIENT
Start: 2025-02-03

## 2025-02-04 NOTE — TELEPHONE ENCOUNTER
Pt notified RX was sent to pharmacy and that she will need to reschedule her annual to another provider

## 2025-02-27 RX ORDER — METFORMIN HYDROCHLORIDE 500 MG/1
TABLET, EXTENDED RELEASE ORAL
Qty: 360 TABLET | Refills: 1 | OUTPATIENT
Start: 2025-02-27

## 2025-04-02 NOTE — PROGRESS NOTES
Juliet Domínguez is a 49 y.o. female returns for an annual exam     Chief Complaint   Patient presents with    Annual Exam       Patient's last menstrual period was 04/08/2025 (approximate).  Her periods are light in flow and usually regular with a 26-32 day interval with 3-7 day duration.  She does not have dysmenorrhea.  Problems: problems - wants to talk about whether or not to refill the metformin  Birth Control: OCP (estrogen/progesterone).  Last Pap: normal HPV negative obtained 2/18/22  She does not have a history of FLORECITA 2, 3 or cervical cancer.   Last Mammogram: normal obtained 4/29/24  Last Bone Density: doesn't meet criteria   Last colonoscopy: obtained 5/11/23      1. Have you been to the ER, urgent care clinic, or hospitalized since your last visit? No    2. Have you seen or consulted any other health care providers outside of the [facility] since your last visit? No    Examination chaperoned by Dheeraj Solano MA.

## 2025-04-14 NOTE — PROGRESS NOTES
Juliet Domínguez is a 49 y.o. female who presents with the following  Chief Complaint   Patient presents with    Follow-up     Migraines      Last office visit note  HPI  Patient is here today for migraine follow-up.  She was last seen January 17, 2023 by [facility] at which time the patient was established on Aimovig 70 mg and was doing well with hardly any migraines occurring.  She would take ibuprofen for rescue therapy for regular dull headaches.  She said that when she did have migraines the severity was low.  She did have Elitriptan for rescue therapy when needed.  All of her medicines stay the same.  Today the patient states that she has not had any migraines since her last visit with Cass.  She continues to use Aimovig 70 mg monthly for migraine prevention.  She continues to have Elitriptan for rescue therapy as needed.  She also is just using over-the-counter ibuprofen for dull headaches that she gets around her menstrual cycle.     We will continue Aimovig 70 mg monthly for migraine prevention  Continue with Elitriptan 40 mg as needed for rescue therapy.  As long as the patient continues to do well, she may follow-up in 1 year.      Today's office visit note  HPI  Patient is here today for yearly Migraine follow-up.  The patient is established on Aimovig 70 mg monthly for migraine prevention and had eletriptan for rescue therapy as needed and she also uses over-the-counter ibuprofen for dull headaches around her menstrual cycle.  Today the patient tells me that she does not have Tona triptan due to her insurance company not wanting to cover it.  She says that she is open to trying something else for migraine rescue therapy.  She continues to take Aimovig 70 mg monthly for migraine prevention but recently ran out of refills as it was time for her next appointment.  She says that she was 2 to 3 weeks without the medication and had a headache that lasted for 3 days at that time.  She says that that 3-day headache

## 2025-04-15 ENCOUNTER — OFFICE VISIT (OUTPATIENT)
Age: 50
End: 2025-04-15
Payer: COMMERCIAL

## 2025-04-15 VITALS
RESPIRATION RATE: 20 BRPM | DIASTOLIC BLOOD PRESSURE: 76 MMHG | SYSTOLIC BLOOD PRESSURE: 128 MMHG | OXYGEN SATURATION: 99 % | HEART RATE: 87 BPM

## 2025-04-15 DIAGNOSIS — G43.011 MIGRAINE WITHOUT AURA, INTRACTABLE, WITH STATUS MIGRAINOSUS: ICD-10-CM

## 2025-04-15 PROCEDURE — 99214 OFFICE O/P EST MOD 30 MIN: CPT

## 2025-04-15 RX ORDER — RIZATRIPTAN BENZOATE 10 MG/1
10 TABLET ORAL PRN
Qty: 9 TABLET | Refills: 11 | Status: SHIPPED | OUTPATIENT
Start: 2025-04-15

## 2025-04-15 RX ORDER — ERENUMAB-AOOE 70 MG/ML
70 INJECTION SUBCUTANEOUS
Qty: 3 ML | Refills: 3 | Status: ACTIVE | OUTPATIENT
Start: 2025-04-15

## 2025-04-15 NOTE — PROGRESS NOTES
Migraines- on the aimovig    A headache around her cycle but that is typical not debilitating   She had one last week that lasted about 3 days but that was because she was overdue for the aimovig injecton she didn't  have anymore refills of the medication    She is about 2 weeks over due right now  Her typical rescue advil or ibuprofen which doesn't really help when she has a headache

## 2025-04-25 ENCOUNTER — OFFICE VISIT (OUTPATIENT)
Age: 50
End: 2025-04-25
Payer: COMMERCIAL

## 2025-04-25 VITALS
OXYGEN SATURATION: 99 % | HEIGHT: 64 IN | SYSTOLIC BLOOD PRESSURE: 102 MMHG | WEIGHT: 142.8 LBS | DIASTOLIC BLOOD PRESSURE: 71 MMHG | HEART RATE: 99 BPM | BODY MASS INDEX: 24.38 KG/M2

## 2025-04-25 DIAGNOSIS — Z01.419 ENCOUNTER FOR GYNECOLOGICAL EXAMINATION: ICD-10-CM

## 2025-04-25 DIAGNOSIS — Z01.419 WELL WOMAN EXAM: Primary | ICD-10-CM

## 2025-04-25 DIAGNOSIS — Z12.4 CERVICAL CANCER SCREENING: ICD-10-CM

## 2025-04-25 DIAGNOSIS — Z11.51 SCREENING FOR HUMAN PAPILLOMAVIRUS: ICD-10-CM

## 2025-04-25 DIAGNOSIS — E28.2 PCOS (POLYCYSTIC OVARIAN SYNDROME): ICD-10-CM

## 2025-04-25 PROCEDURE — 99459 PELVIC EXAMINATION: CPT | Performed by: OBSTETRICS & GYNECOLOGY

## 2025-04-25 PROCEDURE — 99396 PREV VISIT EST AGE 40-64: CPT | Performed by: OBSTETRICS & GYNECOLOGY

## 2025-04-25 NOTE — PROGRESS NOTES
Juliet Domínguez is a No obstetric history on file.,  49 y.o. female White (non-) whose LMP was on 4/8/2025 who presents for her annual checkup. She is having no problems.    Menstrual status:    Her periods are light in flow, regular    She denies dysmenorrhea.    Contraception:    The current method of family planning is OCP (estrogen/progesterone).    Sexual history:    She  reports being sexually active and has had partner(s) who are male. She reports using the following method of birth control/protection: Pill.        Pap and Mammogram History:    Birth  Last Pap: normal HPV negative obtained 2/18/22  She does not have a history of FLORECITA 2, 3 or cervical cancer.   Last Mammogram: normal obtained 4/29/24  Last Bone Density: doesn't meet criteria   Last colonoscopy: obtained 5/11/23      Breast Cancer History    She has no family history of breast cancer.    Family History   Problem Relation Age of Onset    No Known Problems Father     Headache Brother     Elevated Lipids Mother        Past Medical History:   Diagnosis Date    Encounter for screening mammogram for breast cancer 11/28/2016; 11/29/17; 12/26/18    Normal; Normal; Normal    H/O diagnostic mammography 04/25/2022    left breast dx mammo WNL    H/O mammogram 04/17/2023    low risk/Heterogeneously dense    H/O mammogram 04/19/2024    Headache     History of mammogram 12/30/2019; 12/31/20    negative birads 2 dense; BI-RADS 1: Negative.    Migraine headache     without aura    Pap smear for cervical cancer screening 3/11/10; 9/2/15; 12/26/18; 2/18/2    neg,hpv neg; Negative, HPV negative; Neg/HPV Neg; neg/hpv neg    PCOS (polycystic ovarian syndrome)     Pure hypercholesterolemia 04/19/2024     Past Surgical History:   Procedure Laterality Date    OTHER SURGICAL HISTORY  5/29/12    benign     Current Outpatient Medications   Medication Sig Dispense Refill    Erenumab-aooe (AIMOVIG) 70 MG/ML SOAJ Inject 70 mg into the skin every 30 days 3 mL 3

## 2025-04-26 ENCOUNTER — RESULTS FOLLOW-UP (OUTPATIENT)
Age: 50
End: 2025-04-26

## 2025-04-26 RX ORDER — METFORMIN HYDROCHLORIDE 500 MG/1
TABLET, EXTENDED RELEASE ORAL
Qty: 120 TABLET | Refills: 0 | Status: SHIPPED | OUTPATIENT
Start: 2025-04-26

## 2025-04-26 RX ORDER — DROSPIRENONE AND ETHINYL ESTRADIOL 0.02-3(28)
1 KIT ORAL DAILY
Qty: 3 PACKET | Refills: 4 | Status: SHIPPED | OUTPATIENT
Start: 2025-04-26

## 2025-05-04 LAB
CYTOLOGIST CVX/VAG CYTO: ABNORMAL
CYTOLOGY CVX/VAG DOC CYTO: ABNORMAL
CYTOLOGY CVX/VAG DOC THIN PREP: ABNORMAL
DX ICD CODE: ABNORMAL
HPV GENOTYPE REFLEX: ABNORMAL
HPV I/H RISK 4 DNA CVX QL PROBE+SIG AMP: POSITIVE
HPV16 DNA CVX QL PROBE+SIG AMP: NEGATIVE
HPV18+45 E6+E7 MRNA CVX QL NAA+PROBE: NEGATIVE
OTHER STN SPEC: ABNORMAL
PATHOLOGIST CVX/VAG CYTO: ABNORMAL
SERVICE CMNT-IMP: ABNORMAL
STAT OF ADQ CVX/VAG CYTO-IMP: ABNORMAL

## 2025-05-05 ENCOUNTER — RESULTS FOLLOW-UP (OUTPATIENT)
Age: 50
End: 2025-05-05

## 2025-05-07 ENCOUNTER — OFFICE VISIT (OUTPATIENT)
Facility: CLINIC | Age: 50
End: 2025-05-07
Payer: COMMERCIAL

## 2025-05-07 VITALS
RESPIRATION RATE: 16 BRPM | DIASTOLIC BLOOD PRESSURE: 75 MMHG | WEIGHT: 141 LBS | BODY MASS INDEX: 24.07 KG/M2 | HEIGHT: 64 IN | SYSTOLIC BLOOD PRESSURE: 112 MMHG | TEMPERATURE: 97.9 F | HEART RATE: 86 BPM

## 2025-05-07 DIAGNOSIS — Z00.01 ENCOUNTER FOR WELL ADULT EXAM WITH ABNORMAL FINDINGS: ICD-10-CM

## 2025-05-07 DIAGNOSIS — E28.2 PCOS (POLYCYSTIC OVARIAN SYNDROME): ICD-10-CM

## 2025-05-07 DIAGNOSIS — Z86.39 HISTORY OF INSULIN RESISTANCE: ICD-10-CM

## 2025-05-07 DIAGNOSIS — Z00.01 ENCOUNTER FOR WELL ADULT EXAM WITH ABNORMAL FINDINGS: Primary | ICD-10-CM

## 2025-05-07 DIAGNOSIS — E78.00 PURE HYPERCHOLESTEROLEMIA: ICD-10-CM

## 2025-05-07 DIAGNOSIS — Z13.1 DIABETES MELLITUS SCREENING: ICD-10-CM

## 2025-05-07 DIAGNOSIS — G43.011 MIGRAINE WITHOUT AURA, INTRACTABLE, WITH STATUS MIGRAINOSUS: ICD-10-CM

## 2025-05-07 PROCEDURE — 99396 PREV VISIT EST AGE 40-64: CPT | Performed by: FAMILY MEDICINE

## 2025-05-07 RX ORDER — METFORMIN HYDROCHLORIDE 500 MG/1
2000 TABLET, EXTENDED RELEASE ORAL
Qty: 360 TABLET | Refills: 3 | Status: SHIPPED | OUTPATIENT
Start: 2025-05-07

## 2025-05-07 SDOH — ECONOMIC STABILITY: FOOD INSECURITY: WITHIN THE PAST 12 MONTHS, YOU WORRIED THAT YOUR FOOD WOULD RUN OUT BEFORE YOU GOT MONEY TO BUY MORE.: NEVER TRUE

## 2025-05-07 SDOH — ECONOMIC STABILITY: FOOD INSECURITY: WITHIN THE PAST 12 MONTHS, THE FOOD YOU BOUGHT JUST DIDN'T LAST AND YOU DIDN'T HAVE MONEY TO GET MORE.: NEVER TRUE

## 2025-05-07 ASSESSMENT — PATIENT HEALTH QUESTIONNAIRE - PHQ9
SUM OF ALL RESPONSES TO PHQ QUESTIONS 1-9: 0
SUM OF ALL RESPONSES TO PHQ QUESTIONS 1-9: 0
1. LITTLE INTEREST OR PLEASURE IN DOING THINGS: NOT AT ALL
SUM OF ALL RESPONSES TO PHQ QUESTIONS 1-9: 0
SUM OF ALL RESPONSES TO PHQ QUESTIONS 1-9: 0
2. FEELING DOWN, DEPRESSED OR HOPELESS: NOT AT ALL

## 2025-05-07 NOTE — PROGRESS NOTES
Well Adult Note  Name: Juliet Domínguez Today’s Date: 2025   MRN: [id number] Sex: Female   Age: 49 y.o. Ethnicity: Non- / Non    : 1975 Race: White (non-)      Juliet Domínguez is here for a well adult exam.          Assessment & Plan  1. Annual wellness examination.  - Order comprehensive labs, including cholesterol panel and vitamin D levels.    2. Polycystic Ovary Syndrome (PCOS): Stable.  - Manages with metformin 2000 mg extended release daily, well tolerated.  - Send prescription for year's supply of metformin.  - Hormone replacement therapy included in management.    3. Migraines.  - Lapse in Aimovig treatment for one month caused headaches, resolved upon resumption.  - Advise contacting office if difficulties obtaining Aimovig prescription arise.    Follow-up  - Annual dermatologist visits.    Encounter for well adult exam with abnormal findings  -     CBC; Future  -     Comprehensive Metabolic Panel; Future  -     Vitamin D 25 Hydroxy; Future  -     Lipoprotein NMR; Future  -     Thyroid Cascade Profile; Future  PCOS (polycystic ovarian syndrome)  -     metFORMIN (GLUCOPHAGE-XR) 500 MG extended release tablet; Take 4 tablets by mouth daily (with breakfast) TAKE 4 TABLETS BY MOUTH DAILY (WITH DINNER)., Disp-360 tablet, R-3Normal  -     CBC; Future  -     Comprehensive Metabolic Panel; Future  -     Vitamin D 25 Hydroxy; Future  -     Thyroid Cascade Profile; Future  Pure hypercholesterolemia  -     Lipoprotein NMR; Future  -     Thyroid Cascade Profile; Future  Migraine without aura, intractable, with status migrainosus  History of insulin resistance  -     Hemoglobin A1C; Future  Diabetes mellitus screening  -     Hemoglobin A1C; Future    Results         No follow-ups on file.     Subjective   History of Present Illness  The patient is a 49-year-old female presenting for her annual wellness examination.    Hypercholesterolemia  - History of hypercholesterolemia  - No familial

## 2025-05-07 NOTE — PROGRESS NOTES
Chief Complaint   Patient presents with    Annual Exam     AWV has no new concerns for pcp today.  Thing have been going well. No labs prior to apt

## 2025-06-05 ENCOUNTER — RESULTS FOLLOW-UP (OUTPATIENT)
Facility: CLINIC | Age: 50
End: 2025-06-05

## 2025-06-05 LAB
25(OH)D3+25(OH)D2 SERPL-MCNC: 51.2 NG/ML (ref 30–100)
ALBUMIN SERPL-MCNC: 4.2 G/DL (ref 3.9–4.9)
ALP SERPL-CCNC: 57 IU/L (ref 44–121)
ALT SERPL-CCNC: 27 IU/L (ref 0–32)
AST SERPL-CCNC: 29 IU/L (ref 0–40)
BILIRUB SERPL-MCNC: 0.7 MG/DL (ref 0–1.2)
BUN SERPL-MCNC: 18 MG/DL (ref 6–24)
BUN/CREAT SERPL: 23 (ref 9–23)
CALCIUM SERPL-MCNC: 9.9 MG/DL (ref 8.7–10.2)
CHLORIDE SERPL-SCNC: 103 MMOL/L (ref 96–106)
CO2 SERPL-SCNC: 16 MMOL/L (ref 20–29)
CREAT SERPL-MCNC: 0.78 MG/DL (ref 0.57–1)
EGFRCR SERPLBLD CKD-EPI 2021: 94 ML/MIN/1.73
ERYTHROCYTE [DISTWIDTH] IN BLOOD BY AUTOMATED COUNT: 12.4 % (ref 11.7–15.4)
GLOBULIN SER CALC-MCNC: 2.4 G/DL (ref 1.5–4.5)
GLUCOSE SERPL-MCNC: 97 MG/DL (ref 70–99)
HBA1C MFR BLD: 5 % (ref 4.8–5.6)
HCT VFR BLD AUTO: 41 % (ref 34–46.6)
HGB BLD-MCNC: 13.5 G/DL (ref 11.1–15.9)
MCH RBC QN AUTO: 31.5 PG (ref 26.6–33)
MCHC RBC AUTO-ENTMCNC: 32.9 G/DL (ref 31.5–35.7)
MCV RBC AUTO: 96 FL (ref 79–97)
PLATELET # BLD AUTO: 340 X10E3/UL (ref 150–450)
POTASSIUM SERPL-SCNC: 4.9 MMOL/L (ref 3.5–5.2)
PROT SERPL-MCNC: 6.6 G/DL (ref 6–8.5)
RBC # BLD AUTO: 4.28 X10E6/UL (ref 3.77–5.28)
SODIUM SERPL-SCNC: 138 MMOL/L (ref 134–144)
TSH SERPL DL<=0.005 MIU/L-ACNC: 2.38 UIU/ML (ref 0.45–4.5)
WBC # BLD AUTO: 6.2 X10E3/UL (ref 3.4–10.8)

## 2025-06-06 LAB
CHOLEST SERPL-MCNC: 227 MG/DL (ref 100–199)
HDL SERPL-SCNC: 57.3 UMOL/L
HDLC SERPL-MCNC: 99 MG/DL
IMP & REVIEW OF LAB RESULTS: NORMAL
LDL SERPL QN: 21.4 NM
LDL SERPL-SCNC: 1172 NMOL/L
LDL SMALL SERPL-SCNC: 278 NMOL/L
LDLC SERPL CALC-MCNC: 112 MG/DL (ref 0–99)
LP-IR SCORE SERPL: 32
TRIGL SERPL-MCNC: 97 MG/DL (ref 0–149)